# Patient Record
Sex: MALE | Race: WHITE | NOT HISPANIC OR LATINO | Employment: FULL TIME | URBAN - METROPOLITAN AREA
[De-identification: names, ages, dates, MRNs, and addresses within clinical notes are randomized per-mention and may not be internally consistent; named-entity substitution may affect disease eponyms.]

---

## 2023-08-15 PROBLEM — E74.10 DIETARY FRUCTOSE INTOLERANCE: Status: ACTIVE | Noted: 2023-08-15

## 2023-08-15 PROBLEM — E55.9 VITAMIN D DEFICIENCY: Status: ACTIVE | Noted: 2023-08-15

## 2023-08-15 PROBLEM — R35.0 URINARY FREQUENCY: Status: ACTIVE | Noted: 2023-08-15

## 2023-08-15 PROBLEM — G56.00 CARPAL TUNNEL SYNDROME: Status: ACTIVE | Noted: 2023-08-15

## 2023-08-15 PROBLEM — K57.90 DIVERTICULOSIS: Status: ACTIVE | Noted: 2023-08-15

## 2023-08-15 PROBLEM — R91.1 INCIDENTAL LUNG NODULE, > 3MM AND < 8MM: Status: ACTIVE | Noted: 2023-08-15

## 2023-08-15 PROBLEM — E79.0 HYPERURICEMIA: Status: ACTIVE | Noted: 2023-08-15

## 2023-08-15 PROBLEM — K21.9 GASTROESOPHAGEAL REFLUX DISEASE: Status: ACTIVE | Noted: 2023-08-15

## 2023-08-15 PROBLEM — G47.30 SLEEP APNEA: Status: ACTIVE | Noted: 2023-08-15

## 2023-08-15 PROBLEM — M21.619 BUNION: Status: ACTIVE | Noted: 2023-08-15

## 2023-08-15 PROBLEM — I10 ESSENTIAL HYPERTENSION, BENIGN: Status: ACTIVE | Noted: 2023-08-15

## 2023-08-15 PROBLEM — E78.5 HYPERLIPIDEMIA: Status: ACTIVE | Noted: 2023-08-15

## 2023-08-15 PROBLEM — H18.20 CORNEAL EDEMA: Status: ACTIVE | Noted: 2023-08-15

## 2023-08-15 PROBLEM — T86.8409 CORNEAL GRAFT REJECTION: Status: ACTIVE | Noted: 2023-08-15

## 2023-08-15 PROBLEM — K64.9 BLEEDING HEMORRHOIDS: Status: ACTIVE | Noted: 2023-08-15

## 2023-08-15 PROBLEM — H40.9 GLAUCOMA: Status: ACTIVE | Noted: 2023-08-15

## 2023-08-15 PROBLEM — R19.7 DIARRHEA, UNSPECIFIED: Status: ACTIVE | Noted: 2023-08-15

## 2023-08-15 PROBLEM — E66.3 OVERWEIGHT: Status: ACTIVE | Noted: 2023-08-15

## 2023-08-15 PROBLEM — M10.9 GOUT: Status: ACTIVE | Noted: 2023-08-15

## 2023-08-15 PROBLEM — E78.2 HYPERLIPIDEMIA, MIXED: Status: ACTIVE | Noted: 2023-08-15

## 2023-08-15 RX ORDER — LATANOPROST 50 UG/ML
1 SOLUTION/ DROPS OPHTHALMIC NIGHTLY
COMMUNITY
Start: 2019-10-25 | End: 2023-12-08 | Stop reason: SDUPTHER

## 2023-08-15 RX ORDER — VIT C/E/ZN/COPPR/LUTEIN/ZEAXAN 250MG-90MG
1 CAPSULE ORAL 2 TIMES DAILY
COMMUNITY
Start: 2015-02-25

## 2023-08-15 RX ORDER — PANTOPRAZOLE SODIUM 40 MG/1
1 TABLET, DELAYED RELEASE ORAL DAILY
COMMUNITY
End: 2024-04-19 | Stop reason: SDUPTHER

## 2023-08-15 RX ORDER — ALLOPURINOL 100 MG/1
2 TABLET ORAL DAILY
COMMUNITY
End: 2024-04-19 | Stop reason: SDUPTHER

## 2023-08-15 RX ORDER — ATORVASTATIN CALCIUM 40 MG/1
1 TABLET, FILM COATED ORAL NIGHTLY
COMMUNITY
Start: 2022-02-21 | End: 2024-04-19 | Stop reason: SDUPTHER

## 2023-08-15 RX ORDER — LISINOPRIL 10 MG/1
0.5 TABLET ORAL 2 TIMES DAILY
COMMUNITY
End: 2024-04-19 | Stop reason: SDUPTHER

## 2023-08-15 RX ORDER — PREDNISOLONE ACETATE 10 MG/ML
1 SUSPENSION/ DROPS OPHTHALMIC DAILY
COMMUNITY
Start: 2018-12-14 | End: 2023-12-08 | Stop reason: SDUPTHER

## 2023-08-15 RX ORDER — POLYMYXIN B SULFATE AND TRIMETHOPRIM 1; 10000 MG/ML; [USP'U]/ML
1 SOLUTION OPHTHALMIC ONCE
COMMUNITY
Start: 2017-12-12 | End: 2023-12-08 | Stop reason: SDUPTHER

## 2023-08-26 PROBLEM — K52.9 CHRONIC NONSPECIFIC COLITIS: Status: ACTIVE | Noted: 2023-08-26

## 2023-08-26 PROBLEM — D22.5 MELANOCYTIC NEVI OF TRUNK: Status: ACTIVE | Noted: 2023-07-14

## 2023-08-26 PROBLEM — L81.9 CHANGE IN PIGMENTED LESION OF FACE: Status: ACTIVE | Noted: 2023-08-26

## 2023-08-26 PROBLEM — D22.60 MELANOCYTIC NEVI OF UNSPECIFIED UPPER LIMB, INCLUDING SHOULDER: Status: ACTIVE | Noted: 2023-07-14

## 2023-08-26 PROBLEM — L57.9 SKIN CHANGES DUE TO CHRONIC EXPOSURE TO NONIONIZING RADIATION, UNSPECIFIED: Status: ACTIVE | Noted: 2023-07-14

## 2023-08-26 PROBLEM — R21 RASH: Status: ACTIVE | Noted: 2023-08-26

## 2023-08-26 PROBLEM — Z86.19 HISTORY OF HERPES ZOSTER: Status: ACTIVE | Noted: 2023-08-26

## 2023-08-26 PROBLEM — Z87.891 FORMER SMOKER: Status: ACTIVE | Noted: 2023-08-26

## 2023-08-26 PROBLEM — L57.0 ACTINIC KERATOSIS: Status: ACTIVE | Noted: 2023-07-14

## 2023-08-26 PROBLEM — D22.30 MELANOCYTIC NEVI OF UNSPECIFIED PART OF FACE: Status: ACTIVE | Noted: 2023-07-14

## 2023-08-26 PROBLEM — L81.4 OTHER MELANIN HYPERPIGMENTATION: Status: ACTIVE | Noted: 2023-07-14

## 2023-08-26 PROBLEM — D18.01 HEMANGIOMA OF SKIN AND SUBCUTANEOUS TISSUE: Status: ACTIVE | Noted: 2023-07-14

## 2023-08-26 RX ORDER — DOXAZOSIN 2 MG/1
2 TABLET ORAL DAILY
COMMUNITY
End: 2023-12-28

## 2023-09-27 PROBLEM — E66.3 OVERWEIGHT WITH BODY MASS INDEX (BMI) OF 26 TO 26.9 IN ADULT: Status: ACTIVE | Noted: 2023-09-27

## 2023-10-03 ENCOUNTER — APPOINTMENT (OUTPATIENT)
Dept: OPHTHALMOLOGY | Facility: CLINIC | Age: 68
End: 2023-10-03
Payer: MEDICARE

## 2023-11-07 ENCOUNTER — OFFICE VISIT (OUTPATIENT)
Dept: OPHTHALMOLOGY | Facility: CLINIC | Age: 68
End: 2023-11-07
Payer: MEDICARE

## 2023-11-07 DIAGNOSIS — H40.1112 PRIMARY OPEN ANGLE GLAUCOMA (POAG) OF RIGHT EYE, MODERATE STAGE: Primary | ICD-10-CM

## 2023-11-07 PROCEDURE — 99213 OFFICE O/P EST LOW 20 MIN: CPT | Performed by: OPHTHALMOLOGY

## 2023-11-07 ASSESSMENT — CONF VISUAL FIELD
OS_NORMAL: 1
OS_INFERIOR_TEMPORAL_RESTRICTION: 0
OD_SUPERIOR_NASAL_RESTRICTION: 0
OD_INFERIOR_TEMPORAL_RESTRICTION: 0
OD_NORMAL: 1
OS_SUPERIOR_NASAL_RESTRICTION: 0
OD_INFERIOR_NASAL_RESTRICTION: 0
OD_SUPERIOR_TEMPORAL_RESTRICTION: 0
OS_INFERIOR_NASAL_RESTRICTION: 0
OS_SUPERIOR_TEMPORAL_RESTRICTION: 0

## 2023-11-07 ASSESSMENT — CUP TO DISC RATIO
OS_RATIO: 0.65
OD_RATIO: 0.65

## 2023-11-07 ASSESSMENT — TONOMETRY
IOP_METHOD: GOLDMANN APPLANATION
OS_IOP_MMHG: 14
OD_IOP_MMHG: 13

## 2023-11-07 ASSESSMENT — SLIT LAMP EXAM - LIDS
COMMENTS: NORMAL
COMMENTS: NORMAL

## 2023-11-07 ASSESSMENT — VISUAL ACUITY
METHOD: SNELLEN - LINEAR
OD_SC: 20/20-2
OS_SC: 20/20

## 2023-11-24 ENCOUNTER — OFFICE VISIT (OUTPATIENT)
Dept: OPHTHALMOLOGY | Facility: CLINIC | Age: 68
End: 2023-11-24
Payer: MEDICARE

## 2023-11-24 DIAGNOSIS — Z94.7 CORNEAL TRANSPLANT STATUS: ICD-10-CM

## 2023-11-24 DIAGNOSIS — T86.8409 CORNEAL GRAFT REJECTION: Primary | ICD-10-CM

## 2023-11-24 PROCEDURE — 99212 OFFICE O/P EST SF 10 MIN: CPT | Performed by: OPTOMETRIST

## 2023-11-24 ASSESSMENT — SLIT LAMP EXAM - LIDS
COMMENTS: NORMAL
COMMENTS: NORMAL

## 2023-11-24 ASSESSMENT — REFRACTION_CURRENTRX
OD_BASECURVE: 9.8
OD_CYLINDER: SPHERE
OD_DIAMETER: 16.0
OD_SPHERE: -3.00

## 2023-11-24 ASSESSMENT — VISUAL ACUITY
OS_SC: 20/20
CORRECTION_TYPE: CONTACTS
OD_CC+: -2
OD_CC: 20/200
VA_OR_OD_CURRENT_RX: 20/200-
METHOD: SNELLEN - LINEAR

## 2023-11-24 NOTE — PROGRESS NOTES
Assessment/Plan   Diagnoses and all orders for this visit:  Corneal graft rejection  - Pt status post Kpro. 3 month BCL replacement. Kontour Precision sphere removed and replaced right eye (OD). Patient tolerated the procedure well. RTC every 3 months for BCL replacement, sooner with problems.

## 2023-11-24 NOTE — PROGRESS NOTES
Assessment/Plan   Diagnoses and all orders for this visit:  Corneal graft rejection  Corneal transplant status    BSCL exchanged right eye (OD)    Return in 3 months for next replacement, there are two more left in stock

## 2023-11-30 ENCOUNTER — OFFICE VISIT (OUTPATIENT)
Dept: PRIMARY CARE | Facility: CLINIC | Age: 68
End: 2023-11-30
Payer: MEDICARE

## 2023-11-30 VITALS
HEART RATE: 78 BPM | SYSTOLIC BLOOD PRESSURE: 129 MMHG | TEMPERATURE: 97.2 F | BODY MASS INDEX: 28.17 KG/M2 | WEIGHT: 208 LBS | DIASTOLIC BLOOD PRESSURE: 74 MMHG | HEIGHT: 72 IN

## 2023-11-30 DIAGNOSIS — E78.2 MIXED HYPERLIPIDEMIA: ICD-10-CM

## 2023-11-30 DIAGNOSIS — N52.9 ERECTILE DYSFUNCTION, UNSPECIFIED ERECTILE DYSFUNCTION TYPE: ICD-10-CM

## 2023-11-30 DIAGNOSIS — R91.1 INCIDENTAL LUNG NODULE, > 3MM AND < 8MM: ICD-10-CM

## 2023-11-30 DIAGNOSIS — E55.9 VITAMIN D DEFICIENCY: ICD-10-CM

## 2023-11-30 DIAGNOSIS — E66.3 OVERWEIGHT: ICD-10-CM

## 2023-11-30 DIAGNOSIS — K21.9 GASTROESOPHAGEAL REFLUX DISEASE WITHOUT ESOPHAGITIS: ICD-10-CM

## 2023-11-30 DIAGNOSIS — I10 ESSENTIAL HYPERTENSION, BENIGN: Primary | ICD-10-CM

## 2023-11-30 DIAGNOSIS — E78.2 HYPERLIPIDEMIA, MIXED: ICD-10-CM

## 2023-11-30 DIAGNOSIS — G47.30 SLEEP APNEA, UNSPECIFIED TYPE: ICD-10-CM

## 2023-11-30 DIAGNOSIS — E79.0 HYPERURICEMIA: ICD-10-CM

## 2023-11-30 DIAGNOSIS — K57.90 DIVERTICULOSIS: ICD-10-CM

## 2023-11-30 DIAGNOSIS — Z87.891 FORMER SMOKER: ICD-10-CM

## 2023-11-30 DIAGNOSIS — H40.9 GLAUCOMA, UNSPECIFIED GLAUCOMA TYPE, UNSPECIFIED LATERALITY: ICD-10-CM

## 2023-11-30 PROCEDURE — 3078F DIAST BP <80 MM HG: CPT | Performed by: INTERNAL MEDICINE

## 2023-11-30 PROCEDURE — 90662 IIV NO PRSV INCREASED AG IM: CPT | Performed by: INTERNAL MEDICINE

## 2023-11-30 PROCEDURE — 3074F SYST BP LT 130 MM HG: CPT | Performed by: INTERNAL MEDICINE

## 2023-11-30 PROCEDURE — 1159F MED LIST DOCD IN RCRD: CPT | Performed by: INTERNAL MEDICINE

## 2023-11-30 PROCEDURE — 99213 OFFICE O/P EST LOW 20 MIN: CPT | Performed by: INTERNAL MEDICINE

## 2023-11-30 PROCEDURE — 1160F RVW MEDS BY RX/DR IN RCRD: CPT | Performed by: INTERNAL MEDICINE

## 2023-11-30 PROCEDURE — G0008 ADMIN INFLUENZA VIRUS VAC: HCPCS | Performed by: INTERNAL MEDICINE

## 2023-11-30 NOTE — PROGRESS NOTES
Assessment/Plan   68 years old male with chronic medical problems came for a follow-up visit.  Clinically he is doing well.  His chronic eye problems are stable except he does have glaucoma which needs to be further followed up.  He is complaining of erectile dysfunction which is relatively new.  We discussed the treatment options.  At this time I will hold off till patient has the further glucoma evaluation.  If he is approved by the eye physicians to take the Viagra or similar medication he will be prescribed.  The other options were reviewed and discussed.    Patient's other chronic problems are stable including hypertension hyperlipidemia.  Immunizations reviewed and updated.    Patient will follow-up with me for his regular care in 6 months time except if there is any problem he understands to see me soon.      Problem List Items Addressed This Visit       Diverticulosis    Essential hypertension, benign - Primary    Relevant Orders    CBC and Auto Differential    Comprehensive Metabolic Panel    TSH with reflex to Free T4 if abnormal    Gastroesophageal reflux disease    Glaucoma    Hyperuricemia    Relevant Orders    Uric Acid    Hyperlipidemia, mixed    Relevant Orders    Comprehensive Metabolic Panel    Lipid Panel    TSH with reflex to Free T4 if abnormal    Hyperlipidemia    Relevant Orders    Comprehensive Metabolic Panel    Lipid Panel    Incidental lung nodule, > 3mm and < 8mm    Overweight    Sleep apnea    Relevant Orders    TSH with reflex to Free T4 if abnormal    Vitamin D deficiency    Relevant Orders    TSH with reflex to Free T4 if abnormal    Vitamin D 25-Hydroxy,Total (for eval of Vitamin D levels)    Former smoker       Subjective     Patient ID: Osman Alaniz is a 68 y.o. male who presents for Follow-up (3 month follow up. ).    History of present illness  Patient came for a follow-up visit.  Overall patient is doing well.  He is following up with the ophthalmologist regularly.  He does  have history of glaucoma and he has a appointment pending with the specialist    Patient is complaining of erectile dysfunction.  He says he gets erection but does not last long.  He also has some problem with ejaculation.    His blood pressure is well-controlled.    He is trying to be active but he admits that he has hard time losing weight    Rest of the review of systems no acute complaints      Family History   Problem Relation Name Age of Onset    Diabetes Mother      Pancreatic cancer Mother      Brain cancer Father        Social History     Socioeconomic History    Marital status:      Spouse name: Not on file    Number of children: Not on file    Years of education: Not on file    Highest education level: Not on file   Occupational History    Not on file   Tobacco Use    Smoking status: Former     Types: Cigarettes    Smokeless tobacco: Never   Substance and Sexual Activity    Alcohol use: Not on file    Drug use: Not on file    Sexual activity: Not on file   Other Topics Concern    Not on file   Social History Narrative    Not on file     Social Determinants of Health     Financial Resource Strain: Not on file   Food Insecurity: Not on file   Transportation Needs: Not on file   Physical Activity: Not on file   Stress: Not on file   Social Connections: Not on file   Intimate Partner Violence: Not on file   Housing Stability: Not on file      Doxycycline and Penicillins         Objective     Vitals:    11/30/23 0824   BP: 129/74   Pulse: 78   Temp: 36.2 °C (97.2 °F)        Physical Exam  Moderate obese, well-nourished with no apparent distress. Alert oriented,  Skin: Normal turgor.   Head: Normocephalic, atraumatic.  Eyes: No acute changes, both eyes and cornea has no irritation. No conjunctivitis  No pallor of conjunctivae. Mucous membranes are moist  Neck: Supple. No JVD. No carotid bruit. No thyromegaly. No cervical lymphadenopathy.  No clubbing. Peripheral osteoarthritis noted.  Chest: Vesicular  breathing Bilaterally good air entry. No wheezing. No crackles.  Heart: Regular rate and rhythm. S1, S2 positive. No murmur.  Abdomen: Soft and nontender. Bowel sounds are positive. No organomegaly.  Extremities: Bilaterally no pedal pitting edema. Bilaterally 2+ dorsalis pedis pulses.  No calf tenderness. Homans sign is negative.  Neuro Exam: No focal signs. Gait is normal.   Problem List Items Addressed This Visit       Diverticulosis    Essential hypertension, benign - Primary    Relevant Orders    CBC and Auto Differential    Comprehensive Metabolic Panel    TSH with reflex to Free T4 if abnormal    Gastroesophageal reflux disease    Glaucoma    Hyperuricemia    Relevant Orders    Uric Acid    Hyperlipidemia, mixed    Relevant Orders    Comprehensive Metabolic Panel    Lipid Panel    TSH with reflex to Free T4 if abnormal    Hyperlipidemia    Relevant Orders    Comprehensive Metabolic Panel    Lipid Panel    Incidental lung nodule, > 3mm and < 8mm    Overweight    Sleep apnea    Relevant Orders    TSH with reflex to Free T4 if abnormal    Vitamin D deficiency    Relevant Orders    TSH with reflex to Free T4 if abnormal    Vitamin D 25-Hydroxy,Total (for eval of Vitamin D levels)    Former smoker        Orders Placed This Encounter   Procedures    Flu vaccine, quadrivalent, high-dose, preservative free, age 65y+ (FLUZONE)    CBC and Auto Differential     Standing Status:   Future     Standing Expiration Date:   11/30/2024     Order Specific Question:   Release result to [facility]     Answer:   Immediate    Comprehensive Metabolic Panel     Standing Status:   Future     Standing Expiration Date:   11/30/2024     Order Specific Question:   Release result to My[facility]     Answer:   Immediate    Lipid Panel     fasting     Standing Status:   Future     Standing Expiration Date:   5/30/2024     Order Specific Question:   Release result to [facility]     Answer:   Immediate [1]    Uric Acid     Standing Status:   Future      Standing Expiration Date:   11/30/2024     Order Specific Question:   Release result to [facility]     Answer:   Immediate [1]    TSH with reflex to Free T4 if abnormal     Standing Status:   Future     Standing Expiration Date:   11/30/2024     Order Specific Question:   Release result to [facility]     Answer:   Immediate    Vitamin D 25-Hydroxy,Total (for eval of Vitamin D levels)     Standing Status:   Future     Standing Expiration Date:   11/30/2024     Order Specific Question:   [facility] to [facility]     Answer:   Immediate        Lab Results   Component Value Date    WBC 5.1 08/31/2022    HGB 14.8 08/31/2022    HCT 45.3 08/31/2022     08/31/2022    CHOL 153 08/31/2022    TRIG 126 08/31/2022    HDL 48.0 08/31/2022    ALT 28 08/31/2022    AST 19 08/31/2022     08/31/2022    K 4.6 08/31/2022     08/31/2022    CREATININE 1.15 08/31/2022    BUN 17 08/31/2022    CO2 29 08/31/2022    TSH 1.84 08/31/2022     Lab Results   Component Value Date    CHOL 153 08/31/2022    CHHDL 3.2 08/31/2022       No results found.

## 2023-12-08 ENCOUNTER — OFFICE VISIT (OUTPATIENT)
Dept: OPHTHALMOLOGY | Facility: CLINIC | Age: 68
End: 2023-12-08
Payer: MEDICARE

## 2023-12-08 DIAGNOSIS — T86.8409 CORNEAL GRAFT REJECTION: Primary | ICD-10-CM

## 2023-12-08 DIAGNOSIS — Z94.7 CORNEAL TRANSPLANT STATUS: ICD-10-CM

## 2023-12-08 DIAGNOSIS — H40.1112 PRIMARY OPEN ANGLE GLAUCOMA (POAG) OF RIGHT EYE, MODERATE STAGE: ICD-10-CM

## 2023-12-08 DIAGNOSIS — H18.20 CORNEAL EDEMA: ICD-10-CM

## 2023-12-08 DIAGNOSIS — T85.612A ERODED CORNEAL SUTURE, INITIAL ENCOUNTER: ICD-10-CM

## 2023-12-08 PROCEDURE — 99214 OFFICE O/P EST MOD 30 MIN: CPT | Performed by: OPHTHALMOLOGY

## 2023-12-08 RX ORDER — POLYMYXIN B SULFATE AND TRIMETHOPRIM 1; 10000 MG/ML; [USP'U]/ML
1 SOLUTION OPHTHALMIC DAILY
Qty: 10 ML | Refills: 2 | Status: SHIPPED | OUTPATIENT
Start: 2023-12-08 | End: 2024-12-07

## 2023-12-08 RX ORDER — LATANOPROST 50 UG/ML
1 SOLUTION/ DROPS OPHTHALMIC NIGHTLY
Qty: 2.5 ML | Refills: 6 | Status: SHIPPED | OUTPATIENT
Start: 2023-12-08 | End: 2024-12-07

## 2023-12-08 RX ORDER — PREDNISOLONE ACETATE 10 MG/ML
1 SUSPENSION/ DROPS OPHTHALMIC DAILY
Qty: 5 ML | Refills: 3 | Status: SHIPPED | OUTPATIENT
Start: 2023-12-08 | End: 2024-12-07

## 2023-12-08 ASSESSMENT — VISUAL ACUITY
OD_SC+: +2
METHOD: SNELLEN - LINEAR
OD_SC: 20/400
OS_SC: 20/20

## 2023-12-08 ASSESSMENT — SLIT LAMP EXAM - LIDS
COMMENTS: NORMAL
COMMENTS: NORMAL

## 2023-12-08 ASSESSMENT — TONOMETRY: IOP_METHOD: GOLDMANN APPLANATION

## 2023-12-08 NOTE — PROGRESS NOTES
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 No complaints. Good CL fit.     - retroprosthetic membrane (subtle) Observe  - 3 loose sutures removed today under betadine and moxi cover   - cont polytrim OD QD   - cont prednisolone OD QD   - follow u with Dr. Lord for BCL   - Cornea 6 months      Chronic Angle Closure Glaucoma OD (Secondary to K-Pro) / Glaucoma Suspect OS:      - cont latanoprost OD QHS   - Now cared for by Dr. Bradshaw  - Pt would like clearance to start new ED medicine by Dr. Stein.  - No contraindication from a cornea standpoint  - Explained to pt the risk of ischemic optic neuropathy with ED medication use if he has other co morbidities such as DM or HTN  - Optic disc exam today shows slight cupping OD which is expected given his hx of trauma, multiple transplants and Kpro       Pseudophakia (PCIOL) OD / Early Cataract OS:  minimally visually significant   - Observe      6 months

## 2023-12-08 NOTE — LETTER
December 8, 2023    Marquis Stein MD  [address]  [facility] [address]     Patient: Osman Alaniz   YOB: 1955   Date of Visit: 12/8/2023       Dear Dr. Marquis Stein MD:    Thank you for referring Osman Alaniz to me for evaluation. 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 No complaints. Good CL fit.     - retroprosthetic membrane (subtle) Observe  - 3 loose sutures removed today under betadine and moxi cover   - cont polytrim OD QD   - cont prednisolone OD QD   - follow u with Dr. Lord for BCL   - Cornea 6 months      Chronic Angle Closure Glaucoma OD (Secondary to K-Pro) / Glaucoma Suspect OS:      - cont latanoprost OD QHS   - Now cared for by Dr. Bradshaw  - Pt would like clearance to start new ED medicine by Dr. Stein.  - No contraindication from a cornea standpoint  - Explained to pt the risk of ischemic optic neuropathy with ED medication use if he has other co morbidities such as DM or HTN  - Optic disc exam today shows slight cupping OD which is expected given his hx of trauma, multiple transplants and Kpro       Pseudophakia (PCIOL) OD / Early Cataract OS:  minimally visually significant   - Observe      6 months  Below you will find my full exam findings. If you have questions, please do not hesitate to call me. I look forward to following Osman along with you.         Sincerely,        Christina Bowden MD        CC:   No Recipients        Base Eye Exam       Visual Acuity (Snellen - Linear)         Right Left    Dist sc 20/400 +2 20/20              Tonometry (Goldmann Applanation, 8:38 AM)         Right Left    Pressure BSCL               Pupils         Pupils    Right PERRL, No APD    Left PERRL, No APD              Neuro/Psych       Oriented x3: Yes    Mood/Affect: Normal                  Slit Lamp and Fundus Exam       Slit Lamp Exam         Right  Left    Lids/Lashes Normal Normal    Conjunctiva/Sclera White and quiet White and quiet    Cornea k-pro clear Clear    Anterior Chamber Deep and quiet Deep and quiet    Iris Round and reactive Round and reactive    Lens Posterior chamber intraocular lens 1+ Nuclear sclerosis    Anterior Vitreous Normal Normal

## 2023-12-25 DIAGNOSIS — I10 ESSENTIAL HYPERTENSION, BENIGN: Primary | ICD-10-CM

## 2023-12-28 RX ORDER — DOXAZOSIN 2 MG/1
2 TABLET ORAL DAILY
Qty: 90 TABLET | Refills: 1 | Status: SHIPPED | OUTPATIENT
Start: 2023-12-28 | End: 2024-04-19 | Stop reason: SDUPTHER

## 2024-03-01 ENCOUNTER — APPOINTMENT (OUTPATIENT)
Dept: OPHTHALMOLOGY | Facility: CLINIC | Age: 69
End: 2024-03-01
Payer: MEDICARE

## 2024-03-29 ENCOUNTER — APPOINTMENT (OUTPATIENT)
Dept: OPHTHALMOLOGY | Facility: CLINIC | Age: 69
End: 2024-03-29
Payer: MEDICARE

## 2024-04-05 ENCOUNTER — OFFICE VISIT (OUTPATIENT)
Dept: OPHTHALMOLOGY | Facility: CLINIC | Age: 69
End: 2024-04-05
Payer: MEDICARE

## 2024-04-05 DIAGNOSIS — T86.8409 CORNEAL GRAFT REJECTION: ICD-10-CM

## 2024-04-05 DIAGNOSIS — Z94.7 CORNEAL TRANSPLANT STATUS: Primary | ICD-10-CM

## 2024-04-05 PROCEDURE — 1159F MED LIST DOCD IN RCRD: CPT | Performed by: OPTOMETRIST

## 2024-04-05 PROCEDURE — 99212 OFFICE O/P EST SF 10 MIN: CPT | Performed by: OPTOMETRIST

## 2024-04-05 ASSESSMENT — ENCOUNTER SYMPTOMS
GASTROINTESTINAL NEGATIVE: 0
CARDIOVASCULAR NEGATIVE: 0
EYES NEGATIVE: 0
CONSTITUTIONAL NEGATIVE: 0
PSYCHIATRIC NEGATIVE: 0
ALLERGIC/IMMUNOLOGIC NEGATIVE: 0
MUSCULOSKELETAL NEGATIVE: 0
NEUROLOGICAL NEGATIVE: 0
RESPIRATORY NEGATIVE: 0
ENDOCRINE NEGATIVE: 0
HEMATOLOGIC/LYMPHATIC NEGATIVE: 0

## 2024-04-05 ASSESSMENT — REFRACTION_CURRENTRX
OD_DIAMETER: 16.0
OD_CYLINDER: SPHERE
OD_BASECURVE: 9.8
OD_SPHERE: -3.00

## 2024-04-05 ASSESSMENT — VISUAL ACUITY
OD_CC: 20/200
OS_SC: 20/20
OS_SC+: -2
CORRECTION_TYPE: CONTACTS
OD_CC+: -2
METHOD: SNELLEN - LINEAR

## 2024-04-05 ASSESSMENT — SLIT LAMP EXAM - LIDS
COMMENTS: NORMAL
COMMENTS: NORMAL

## 2024-04-05 NOTE — PROGRESS NOTES
Assessment/Plan   Diagnoses and all orders for this visit:  Corneal transplant status  Corneal graft rejection    Replaced BSCL Right eye with a new Kontur CL which patient wears 24/7  Have 3 more unopened lenses at [facility]  Follow up 3 months to change BSCL Right eye    Follow up Dr Bradshaw and Kal as scheduled

## 2024-04-18 DIAGNOSIS — K21.9 GASTROESOPHAGEAL REFLUX DISEASE WITHOUT ESOPHAGITIS: ICD-10-CM

## 2024-04-18 DIAGNOSIS — E79.0 HYPERURICEMIA: ICD-10-CM

## 2024-04-18 DIAGNOSIS — E78.2 HYPERLIPIDEMIA, MIXED: Primary | ICD-10-CM

## 2024-04-18 DIAGNOSIS — I10 ESSENTIAL HYPERTENSION, BENIGN: ICD-10-CM

## 2024-04-18 NOTE — TELEPHONE ENCOUNTER
Pt requesting rx refill for lisinopril, doxazosin mesylate, allopurinol, atorvastatin calcium and pantoprazole to [facility]. Pt has pending appt 5/30/24 and LOV 11/30/23.

## 2024-04-19 RX ORDER — ALLOPURINOL 100 MG/1
200 TABLET ORAL DAILY
Qty: 180 TABLET | Refills: 3 | Status: SHIPPED | OUTPATIENT
Start: 2024-04-19 | End: 2024-04-29 | Stop reason: SDUPTHER

## 2024-04-19 RX ORDER — ATORVASTATIN CALCIUM 40 MG/1
40 TABLET, FILM COATED ORAL NIGHTLY
Qty: 90 TABLET | Refills: 3 | Status: SHIPPED | OUTPATIENT
Start: 2024-04-19 | End: 2024-04-29 | Stop reason: SDUPTHER

## 2024-04-19 RX ORDER — DOXAZOSIN 2 MG/1
2 TABLET ORAL DAILY
Qty: 90 TABLET | Refills: 3 | Status: SHIPPED | OUTPATIENT
Start: 2024-04-19 | End: 2024-04-29 | Stop reason: SDUPTHER

## 2024-04-19 RX ORDER — LISINOPRIL 10 MG/1
5 TABLET ORAL 2 TIMES DAILY
Qty: 90 TABLET | Refills: 3 | Status: SHIPPED | OUTPATIENT
Start: 2024-04-19 | End: 2024-04-29 | Stop reason: SDUPTHER

## 2024-04-19 RX ORDER — PANTOPRAZOLE SODIUM 40 MG/1
40 TABLET, DELAYED RELEASE ORAL DAILY
Qty: 90 TABLET | Refills: 3 | Status: SHIPPED | OUTPATIENT
Start: 2024-04-19 | End: 2024-04-29 | Stop reason: SDUPTHER

## 2024-04-29 DIAGNOSIS — E79.0 HYPERURICEMIA: ICD-10-CM

## 2024-04-29 DIAGNOSIS — E78.2 HYPERLIPIDEMIA, MIXED: ICD-10-CM

## 2024-04-29 DIAGNOSIS — K21.9 GASTROESOPHAGEAL REFLUX DISEASE WITHOUT ESOPHAGITIS: ICD-10-CM

## 2024-04-29 DIAGNOSIS — I10 ESSENTIAL HYPERTENSION, BENIGN: ICD-10-CM

## 2024-04-29 RX ORDER — ATORVASTATIN CALCIUM 40 MG/1
40 TABLET, FILM COATED ORAL NIGHTLY
Qty: 90 TABLET | Refills: 0 | Status: SHIPPED | OUTPATIENT
Start: 2024-04-29 | End: 2024-05-02 | Stop reason: SDUPTHER

## 2024-04-29 RX ORDER — LISINOPRIL 10 MG/1
5 TABLET ORAL 2 TIMES DAILY
Qty: 90 TABLET | Refills: 0 | Status: SHIPPED | OUTPATIENT
Start: 2024-04-29 | End: 2024-05-02 | Stop reason: SDUPTHER

## 2024-04-29 RX ORDER — DOXAZOSIN 2 MG/1
2 TABLET ORAL DAILY
Qty: 90 TABLET | Refills: 0 | Status: SHIPPED | OUTPATIENT
Start: 2024-04-29 | End: 2024-05-02 | Stop reason: SDUPTHER

## 2024-04-29 RX ORDER — PANTOPRAZOLE SODIUM 40 MG/1
40 TABLET, DELAYED RELEASE ORAL DAILY
Qty: 90 TABLET | Refills: 0 | Status: SHIPPED | OUTPATIENT
Start: 2024-04-29 | End: 2024-05-02 | Stop reason: SDUPTHER

## 2024-04-29 RX ORDER — ALLOPURINOL 100 MG/1
200 TABLET ORAL DAILY
Qty: 180 TABLET | Refills: 0 | Status: SHIPPED | OUTPATIENT
Start: 2024-04-29 | End: 2024-05-02 | Stop reason: SDUPTHER

## 2024-04-29 NOTE — TELEPHONE ENCOUNTER
Rx Refill Request Telephone Encounter    Name:  Osman Alaniz  :  [id number]    Medication Name:    90 days of    lisinopril, doxazosin mesylate, allopurinol, atorvastatin calcium and pantoprazole     Pharmacy:   to [facility]....please cancel [facility] he no longer uses    ------------------------------------------------------------------------------------------------  OUT OF MEDS    lisinopril, doxazosin mesylate, and pantoprazole     14 day supply to   [id number] - [place], [place] - [address] Phone: [phone]   Fax: [phone]          Date of last appointment:  23.   Date of next appointment:  24

## 2024-05-02 DIAGNOSIS — K21.9 GASTROESOPHAGEAL REFLUX DISEASE WITHOUT ESOPHAGITIS: ICD-10-CM

## 2024-05-02 DIAGNOSIS — I10 ESSENTIAL HYPERTENSION, BENIGN: ICD-10-CM

## 2024-05-02 DIAGNOSIS — E79.0 HYPERURICEMIA: ICD-10-CM

## 2024-05-02 DIAGNOSIS — E78.2 HYPERLIPIDEMIA, MIXED: ICD-10-CM

## 2024-05-02 RX ORDER — ATORVASTATIN CALCIUM 40 MG/1
40 TABLET, FILM COATED ORAL NIGHTLY
Qty: 14 TABLET | Refills: 0 | Status: SHIPPED | OUTPATIENT
Start: 2024-05-02 | End: 2024-05-09 | Stop reason: SDUPTHER

## 2024-05-02 RX ORDER — PANTOPRAZOLE SODIUM 40 MG/1
40 TABLET, DELAYED RELEASE ORAL DAILY
Qty: 14 TABLET | Refills: 0 | Status: SHIPPED | OUTPATIENT
Start: 2024-05-02 | End: 2024-05-09 | Stop reason: SDUPTHER

## 2024-05-02 RX ORDER — ALLOPURINOL 100 MG/1
200 TABLET ORAL DAILY
Qty: 28 TABLET | Refills: 0 | Status: SHIPPED | OUTPATIENT
Start: 2024-05-02 | End: 2024-05-09 | Stop reason: SDUPTHER

## 2024-05-02 RX ORDER — LISINOPRIL 10 MG/1
5 TABLET ORAL 2 TIMES DAILY
Qty: 14 TABLET | Refills: 0 | Status: SHIPPED | OUTPATIENT
Start: 2024-05-02 | End: 2024-05-09 | Stop reason: SDUPTHER

## 2024-05-02 RX ORDER — DOXAZOSIN 2 MG/1
2 TABLET ORAL DAILY
Qty: 14 TABLET | Refills: 0 | Status: SHIPPED | OUTPATIENT
Start: 2024-05-02 | End: 2024-05-09 | Stop reason: SDUPTHER

## 2024-05-09 ENCOUNTER — TELEPHONE (OUTPATIENT)
Dept: PRIMARY CARE | Facility: CLINIC | Age: 69
End: 2024-05-09
Payer: MEDICARE

## 2024-05-09 DIAGNOSIS — E78.2 HYPERLIPIDEMIA, MIXED: ICD-10-CM

## 2024-05-09 DIAGNOSIS — I10 ESSENTIAL HYPERTENSION, BENIGN: ICD-10-CM

## 2024-05-09 DIAGNOSIS — E79.0 HYPERURICEMIA: ICD-10-CM

## 2024-05-09 DIAGNOSIS — K21.9 GASTROESOPHAGEAL REFLUX DISEASE WITHOUT ESOPHAGITIS: ICD-10-CM

## 2024-05-09 RX ORDER — LISINOPRIL 10 MG/1
5 TABLET ORAL 2 TIMES DAILY
Qty: 90 TABLET | Refills: 1 | Status: SHIPPED | OUTPATIENT
Start: 2024-05-09 | End: 2024-05-30 | Stop reason: SDUPTHER

## 2024-05-09 RX ORDER — ATORVASTATIN CALCIUM 40 MG/1
40 TABLET, FILM COATED ORAL NIGHTLY
Qty: 90 TABLET | Refills: 1 | Status: SHIPPED | OUTPATIENT
Start: 2024-05-09 | End: 2025-05-09

## 2024-05-09 RX ORDER — ALLOPURINOL 100 MG/1
200 TABLET ORAL DAILY
Qty: 180 TABLET | Refills: 1 | Status: SHIPPED | OUTPATIENT
Start: 2024-05-09 | End: 2025-05-09

## 2024-05-09 RX ORDER — PANTOPRAZOLE SODIUM 40 MG/1
40 TABLET, DELAYED RELEASE ORAL DAILY
Qty: 90 TABLET | Refills: 1 | Status: SHIPPED | OUTPATIENT
Start: 2024-05-09 | End: 2025-05-09

## 2024-05-09 RX ORDER — DOXAZOSIN 2 MG/1
2 TABLET ORAL DAILY
Qty: 90 TABLET | Refills: 1 | Status: SHIPPED | OUTPATIENT
Start: 2024-05-09 | End: 2025-05-09

## 2024-05-09 NOTE — TELEPHONE ENCOUNTER
Pt called and is needing some refills, he got a 2 wk supply at a local pharmacy and then they were supposed to get sent to [facility] but they were canceled. Pt is now about out of medication again so can pt is requesting another 2 wk supply at   [id number], [place] - [address] [place] [address]  Phone: [phone]  Fax: [phone]   And then sent to [facility] NOT the [facility].. pt would like that one removed.    Rx Refill Request Telephone Encounter    Name:  Osman Alaniz  :  [id number]  Medication Name:  lisinopril 10 mg tablet   Medication Name: pantoprazole (ProtoNix) 40 mg EC tablet   Medication Name: doxazosin (Cardura) 2 mg tablet   Medication Name: atorvastatin (Lipitor) 40 mg tablet   Medication Name: allopurinol (Zyloprim) 100 mg tablet     Specific Pharmacy location:  [facility] [address] [place] [address], [place] [address]  Phone: [phone]  Fax: [phone] (2 wk supply) and also [facility]    Date of last appointment:  2023  Date of next appointment:  2024  Best number to reach patient:  [phone]

## 2024-05-10 ENCOUNTER — APPOINTMENT (OUTPATIENT)
Dept: OPHTHALMOLOGY | Facility: CLINIC | Age: 69
End: 2024-05-10
Payer: MEDICARE

## 2024-05-20 ENCOUNTER — APPOINTMENT (OUTPATIENT)
Dept: OPHTHALMOLOGY | Facility: CLINIC | Age: 69
End: 2024-05-20
Payer: MEDICARE

## 2024-05-30 ENCOUNTER — OFFICE VISIT (OUTPATIENT)
Dept: PRIMARY CARE | Facility: CLINIC | Age: 69
End: 2024-05-30
Payer: MEDICARE

## 2024-05-30 VITALS
DIASTOLIC BLOOD PRESSURE: 71 MMHG | WEIGHT: 203.2 LBS | HEART RATE: 60 BPM | SYSTOLIC BLOOD PRESSURE: 119 MMHG | BODY MASS INDEX: 27.52 KG/M2 | HEIGHT: 72 IN

## 2024-05-30 DIAGNOSIS — Z00.00 ROUTINE GENERAL MEDICAL EXAMINATION AT HEALTH CARE FACILITY: Primary | ICD-10-CM

## 2024-05-30 DIAGNOSIS — N52.9 ERECTILE DYSFUNCTION, UNSPECIFIED ERECTILE DYSFUNCTION TYPE: ICD-10-CM

## 2024-05-30 DIAGNOSIS — H91.90 DECREASED HEARING, UNSPECIFIED LATERALITY: ICD-10-CM

## 2024-05-30 DIAGNOSIS — Z87.891 FORMER SMOKER: ICD-10-CM

## 2024-05-30 DIAGNOSIS — Z13.29 SCREENING FOR THYROID DISORDER: ICD-10-CM

## 2024-05-30 DIAGNOSIS — I10 ESSENTIAL HYPERTENSION, BENIGN: ICD-10-CM

## 2024-05-30 DIAGNOSIS — E66.3 OVERWEIGHT: ICD-10-CM

## 2024-05-30 DIAGNOSIS — E78.2 HYPERLIPIDEMIA, MIXED: ICD-10-CM

## 2024-05-30 DIAGNOSIS — E55.9 VITAMIN D DEFICIENCY: ICD-10-CM

## 2024-05-30 DIAGNOSIS — K21.9 GASTROESOPHAGEAL REFLUX DISEASE WITHOUT ESOPHAGITIS: ICD-10-CM

## 2024-05-30 PROCEDURE — 1170F FXNL STATUS ASSESSED: CPT | Performed by: INTERNAL MEDICINE

## 2024-05-30 PROCEDURE — 1123F ACP DISCUSS/DSCN MKR DOCD: CPT | Performed by: INTERNAL MEDICINE

## 2024-05-30 PROCEDURE — 1036F TOBACCO NON-USER: CPT | Performed by: INTERNAL MEDICINE

## 2024-05-30 PROCEDURE — G0009 ADMIN PNEUMOCOCCAL VACCINE: HCPCS | Performed by: INTERNAL MEDICINE

## 2024-05-30 PROCEDURE — 1160F RVW MEDS BY RX/DR IN RCRD: CPT | Performed by: INTERNAL MEDICINE

## 2024-05-30 PROCEDURE — 1159F MED LIST DOCD IN RCRD: CPT | Performed by: INTERNAL MEDICINE

## 2024-05-30 PROCEDURE — 3074F SYST BP LT 130 MM HG: CPT | Performed by: INTERNAL MEDICINE

## 2024-05-30 PROCEDURE — 1158F ADVNC CARE PLAN TLK DOCD: CPT | Performed by: INTERNAL MEDICINE

## 2024-05-30 PROCEDURE — G0439 PPPS, SUBSEQ VISIT: HCPCS | Performed by: INTERNAL MEDICINE

## 2024-05-30 PROCEDURE — 90677 PCV20 VACCINE IM: CPT | Performed by: INTERNAL MEDICINE

## 2024-05-30 PROCEDURE — 3078F DIAST BP <80 MM HG: CPT | Performed by: INTERNAL MEDICINE

## 2024-05-30 RX ORDER — SILDENAFIL 50 MG/1
50 TABLET, FILM COATED ORAL DAILY PRN
Qty: 12 TABLET | Refills: 3 | Status: SHIPPED | OUTPATIENT
Start: 2024-05-30 | End: 2025-05-30

## 2024-05-30 RX ORDER — LISINOPRIL 20 MG/1
20 TABLET ORAL DAILY
Qty: 90 TABLET | Refills: 1 | Status: SHIPPED | OUTPATIENT
Start: 2024-05-30 | End: 2025-05-30

## 2024-05-30 ASSESSMENT — ACTIVITIES OF DAILY LIVING (ADL)
MANAGING_FINANCES: INDEPENDENT
BATHING: INDEPENDENT
TAKING_MEDICATION: INDEPENDENT
DRESSING: INDEPENDENT
GROCERY_SHOPPING: INDEPENDENT
DOING_HOUSEWORK: INDEPENDENT

## 2024-05-30 ASSESSMENT — PATIENT HEALTH QUESTIONNAIRE - PHQ9
2. FEELING DOWN, DEPRESSED OR HOPELESS: NOT AT ALL
1. LITTLE INTEREST OR PLEASURE IN DOING THINGS: NOT AT ALL
SUM OF ALL RESPONSES TO PHQ9 QUESTIONS 1 AND 2: 0

## 2024-05-30 NOTE — PROGRESS NOTES
Assessment/Plan   68 years old male who is generally active has history of hypertension hyperlipidemia and well-controlled.  Medications were reviewed and refilled.    Patient has chronic reflux disease and when he stops the PPI he gets the symptoms and he would like to continue the PPI and he had seen the GI in the past he is up-to-date with the colonoscopy.    He is a neck smoker and we will do a screening test for aortic aneurysm.    He has some feeling of decreased hearing and he will be sent to audiology.    We reviewed and discussed about his living will and DNR orders in detail as noted below.    Patient's erectile dysfunction was reviewed and discussed medically there is no contraindication and he got the okay from the ophthalmologist.  Will start him on generic Viagra side effects risk and benefits were reviewed and discussed.  Patient will call if there is any significant concern or side effects.    Patient will be followed up in 6 months with the blood test as ordered.      Problem List Items Addressed This Visit       Essential hypertension, benign    Relevant Medications    lisinopril 20 mg tablet    Other Relevant Orders    CBC and Auto Differential    Gastroesophageal reflux disease    Hyperlipidemia, mixed    Relevant Orders    Comprehensive Metabolic Panel    Lipid Panel    Overweight    Vitamin D deficiency    Relevant Orders    Vitamin D 25-Hydroxy,Total (for eval of Vitamin D levels)    Former smoker    Relevant Orders    Vascular US abdominal aorta anuerysm AAA screening     Other Visit Diagnoses       Routine general medical examination at health care facility    -  Primary    Relevant Orders    Vascular US abdominal aorta anuerysm AAA screening    Screening for thyroid disorder        Relevant Orders    TSH with reflex to Free T4 if abnormal    Erectile dysfunction, unspecified erectile dysfunction type        Relevant Medications    sildenafil (Viagra) 50 mg tablet            Subjective      Patient ID: Osman Alaniz is a 68 y.o. male who presents for annual wellness visit and also subsequent visit with medications refills and also discussion of erectile dysfunction.    History of present illness  68 years old male with chronic medical problems generally doing well came for a annual wellness visit and his 6 months regular medical care.    Overall patient says he is active but he does not exercise regularly.    He had seen the ophthalmologist and he says he was told by the ophthalmologist that there is no contraindications to take medications for erectile dysfunction such as Viagra.  He has a steady girlfriend and he says he gets erection but gets softer very quickly so he cannot perform.    He denies any chest pain or short of breath.  He is taking his medications and they are reviewed and refilled as appropriate.    He is following up with the ophthalmologist regularly.    He has some feeling of decreased hearing but it has not impaired his day-to-day activities.    He is an ex-smoker stopped smoking about 20 years ago.  ROS  Rest of the review of systems no acute complaints.    Family History   Problem Relation Name Age of Onset   • Diabetes Mother     • Pancreatic cancer Mother     • Brain cancer Father        Social History     Socioeconomic History   • Marital status:      Spouse name: Not on file   • Number of children: Not on file   • Years of education: Not on file   • Highest education level: Not on file   Occupational History   • Not on file   Tobacco Use   • Smoking status: Former     Types: Cigarettes   • Smokeless tobacco: Never   Substance and Sexual Activity   • Alcohol use: Not Currently     Comment: social   • Drug use: Not Currently   • Sexual activity: Not on file   Other Topics Concern   • Not on file   Social History Narrative   • Not on file     Social Determinants of Health     Financial Resource Strain: Not on file   Food Insecurity: Not on file   Transportation  Needs: Not on file   Physical Activity: Not on file   Stress: Not on file   Social Connections: Not on file   Intimate Partner Violence: Not on file   Housing Stability: Not on file      Doxycycline and Penicillins   Current Outpatient Medications   Medication Sig Dispense Refill   • allopurinol (Zyloprim) 100 mg tablet Take 2 tablets (200 mg) by mouth once daily. 180 tablet 1   • atorvastatin (Lipitor) 40 mg tablet Take 1 tablet (40 mg) by mouth once daily at bedtime. 90 tablet 1   • cholecalciferol (Vitamin D-3) 25 MCG (1000 UT) capsule Take 1 capsule (25 mcg) by mouth 2 times a day.     • doxazosin (Cardura) 2 mg tablet Take 1 tablet (2 mg) by mouth once daily. 90 tablet 1   • latanoprost (Xalatan) 0.005 % ophthalmic solution Administer 1 drop into the right eye once daily at bedtime. 2.5 mL 6   • pantoprazole (ProtoNix) 40 mg EC tablet Take 1 tablet (40 mg) by mouth once daily. 90 tablet 1   • polymyxin B sulf-trimethoprim (Polytrim) ophthalmic solution Administer 1 drop into the right eye once daily. 90 day supply 10 mL 2   • prednisoLONE acetate (Pred-Forte) 1 % ophthalmic suspension Administer 1 drop into the right eye once daily. 5 mL 3   • lisinopril 20 mg tablet Take 1 tablet (20 mg) by mouth once daily. 90 tablet 1   • sildenafil (Viagra) 50 mg tablet Take 1 tablet (50 mg) by mouth once daily as needed for erectile dysfunction. 12 tablet 3     No current facility-administered medications for this visit.      Discussed in detail the advance care planning.  He does not have a living will and he says he is family with it because his ex-wife went through it when she had a terminal illness and passed away.  Patient believes his elder daughter would be the person he would appoint has power of  and he is going to talk to her.  We had a detailed discussion about the end-of-life planning and patient says that he will make a decision and bring the papers to us.  The total time I spent with the patient with  end-of-life care discussion including advance care planning is about 17 minutes.    Objective     Vitals:    05/30/24 0803   BP: 119/71   Pulse: 60        Physical Exam  Moderate obese, well-nourished with no apparent distress. Alert oriented,  Mood is appropriate, memory is intact  Hearing is slightly impaired as per patient's history but the exam he is able to hear simple conversations very well.  Skin: Normal turgor.   Head: Normocephalic, atraumatic.  Eyes: No acute changes, both eyes and cornea has no irritation. No conjunctivitis  No pallor of conjunctivae. Mucous membranes are moist  Neck: Supple. No JVD. No carotid bruit. No thyromegaly. No cervical lymphadenopathy.  No clubbing. Peripheral osteoarthritis noted.  Chest: Vesicular breathing Bilaterally good air entry. No wheezing. No crackles.  Heart: Regular rate and rhythm. S1, S2 positive. No murmur.  Abdomen: Soft and nontender. Bowel sounds are positive. No organomegaly.  Extremities: Bilaterally no pedal pitting edema. Bilaterally 2+ dorsalis pedis pulses.  No calf tenderness. Homans sign is negative.  Neuro Exam: No focal signs. Gait is normal.       Problem List Items Addressed This Visit       Essential hypertension, benign    Relevant Medications    lisinopril 20 mg tablet    Other Relevant Orders    CBC and Auto Differential    Gastroesophageal reflux disease    Hyperlipidemia, mixed    Relevant Orders    Comprehensive Metabolic Panel    Lipid Panel    Overweight    Vitamin D deficiency    Relevant Orders    Vitamin D 25-Hydroxy,Total (for eval of Vitamin D levels)    Former smoker    Relevant Orders    Vascular US abdominal aorta anuerysm AAA screening     Other Visit Diagnoses       Routine general medical examination at health care facility    -  Primary    Relevant Orders    Vascular US abdominal aorta anuerysm AAA screening    Screening for thyroid disorder        Relevant Orders    TSH with reflex to Free T4 if abnormal    Erectile  dysfunction, unspecified erectile dysfunction type        Relevant Medications    sildenafil (Viagra) 50 mg tablet             Orders Placed This Encounter   Procedures   • CBC and Auto Differential     Standing Status:   Future     Standing Expiration Date:   11/30/2024     Order Specific Question:   Release result to [facility]     Answer:   Immediate   • Comprehensive Metabolic Panel     Standing Status:   Future     Standing Expiration Date:   11/30/2024     Order Specific Question:   Release result to [facility]     Answer:   Immediate   • Lipid Panel     fasting     Standing Status:   Future     Standing Expiration Date:   11/30/2024     Order Specific Question:   Release result to [facility]     Answer:   Immediate [1]   • TSH with reflex to Free T4 if abnormal     Standing Status:   Future     Standing Expiration Date:   11/30/2024     Order Specific Question:   Release result to [facility]     Answer:   Immediate   • Vitamin D 25-Hydroxy,Total (for eval of Vitamin D levels)     Standing Status:   Future     Standing Expiration Date:   11/30/2024     Order Specific Question:   Release result to [facility]     Answer:   Immediate        Lab Results   Component Value Date    WBC 5.1 08/31/2022    HGB 14.8 08/31/2022    HCT 45.3 08/31/2022     08/31/2022    CHOL 153 08/31/2022    TRIG 126 08/31/2022    HDL 48.0 08/31/2022    ALT 28 08/31/2022    AST 19 08/31/2022     08/31/2022    K 4.6 08/31/2022     08/31/2022    CREATININE 1.15 08/31/2022    BUN 17 08/31/2022    CO2 29 08/31/2022    TSH 1.84 08/31/2022     Lab Results   Component Value Date    CHOL 153 08/31/2022    CHHDL 3.2 08/31/2022       No results found.

## 2024-06-14 ENCOUNTER — APPOINTMENT (OUTPATIENT)
Dept: OPHTHALMOLOGY | Facility: CLINIC | Age: 69
End: 2024-06-14
Payer: MEDICARE

## 2024-06-25 ENCOUNTER — APPOINTMENT (OUTPATIENT)
Dept: OPHTHALMOLOGY | Facility: CLINIC | Age: 69
End: 2024-06-25
Payer: MEDICARE

## 2024-06-27 ENCOUNTER — HOSPITAL ENCOUNTER (OUTPATIENT)
Dept: VASCULAR MEDICINE | Facility: CLINIC | Age: 69
Discharge: HOME | End: 2024-06-27
Payer: MEDICARE

## 2024-06-27 DIAGNOSIS — Z00.00 ROUTINE GENERAL MEDICAL EXAMINATION AT HEALTH CARE FACILITY: ICD-10-CM

## 2024-06-27 DIAGNOSIS — Z87.891 FORMER SMOKER: ICD-10-CM

## 2024-06-27 DIAGNOSIS — Z13.6 ENCOUNTER FOR SCREENING FOR CARDIOVASCULAR DISORDERS: ICD-10-CM

## 2024-06-27 PROCEDURE — 76706 US ABDL AORTA SCREEN AAA: CPT

## 2024-06-27 PROCEDURE — 76706 US ABDL AORTA SCREEN AAA: CPT | Performed by: INTERNAL MEDICINE

## 2024-06-27 NOTE — RESULT ENCOUNTER NOTE
Screening ultrasound of the aorta shows no evidence of aneurysm.  Patient should continue the same management and follow-up as we discussed.

## 2024-06-28 ENCOUNTER — TELEPHONE (OUTPATIENT)
Dept: PRIMARY CARE | Facility: CLINIC | Age: 69
End: 2024-06-28
Payer: MEDICARE

## 2024-06-28 NOTE — TELEPHONE ENCOUNTER
----- Message from Marquis Stein MD sent at 6/27/2024  4:39 PM EDT -----  Screening ultrasound of the aorta shows no evidence of aneurysm.  Patient should continue the same management and follow-up as we discussed.   2

## 2024-07-19 ENCOUNTER — APPOINTMENT (OUTPATIENT)
Dept: DERMATOLOGY | Facility: CLINIC | Age: 69
End: 2024-07-19
Payer: MEDICARE

## 2024-07-19 DIAGNOSIS — L81.4 LENTIGO: ICD-10-CM

## 2024-07-19 DIAGNOSIS — L82.1 SEBORRHEIC KERATOSIS: ICD-10-CM

## 2024-07-19 DIAGNOSIS — Z12.83 ENCOUNTER FOR SCREENING FOR MALIGNANT NEOPLASM OF SKIN: Primary | ICD-10-CM

## 2024-07-19 DIAGNOSIS — L57.0 ACTINIC KERATOSIS: ICD-10-CM

## 2024-07-19 DIAGNOSIS — L57.8 PHOTOAGING OF SKIN: ICD-10-CM

## 2024-07-19 DIAGNOSIS — D18.01 HEMANGIOMA OF SKIN: ICD-10-CM

## 2024-07-19 PROCEDURE — 17000 DESTRUCT PREMALG LESION: CPT | Performed by: DERMATOLOGY

## 2024-07-19 PROCEDURE — 17003 DESTRUCT PREMALG LES 2-14: CPT | Performed by: DERMATOLOGY

## 2024-07-19 PROCEDURE — 1036F TOBACCO NON-USER: CPT | Performed by: DERMATOLOGY

## 2024-07-19 PROCEDURE — 1159F MED LIST DOCD IN RCRD: CPT | Performed by: DERMATOLOGY

## 2024-07-19 PROCEDURE — 99213 OFFICE O/P EST LOW 20 MIN: CPT | Performed by: DERMATOLOGY

## 2024-07-19 ASSESSMENT — DERMATOLOGY PATIENT ASSESSMENT
FOR PATIENTS COMING IN FOR A FOLLOW-UP VISIT - HAVE THERE BEEN ANY CHANGES IN YOUR HEALTH SINCE YOUR LAST VISIT: NO
DO YOU USE A TANNING BED: NO
DO YOU USE SUNSCREEN: OCCASIONALLY
ARE YOU AN ORGAN TRANSPLANT RECIPIENT: NO
DO YOU HAVE ANY NEW OR CHANGING LESIONS: NO

## 2024-07-19 ASSESSMENT — DERMATOLOGY QUALITY OF LIFE (QOL) ASSESSMENT
RATE HOW BOTHERED YOU ARE BY SYMPTOMS OF YOUR SKIN PROBLEM (EG, ITCHING, STINGING BURNING, HURTING OR SKIN IRRITATION): 0 - NEVER BOTHERED
WHAT SINGLE SKIN CONDITION LISTED BELOW IS THE PATIENT ANSWERING THE QUALITY-OF-LIFE ASSESSMENT QUESTIONS ABOUT: NONE OF THE ABOVE
ARE THERE EXCLUSIONS OR EXCEPTIONS FOR THE QUALITY OF LIFE ASSESSMENT: NO
RATE HOW BOTHERED YOU ARE BY EFFECTS OF YOUR SKIN PROBLEMS ON YOUR ACTIVITIES (EG, GOING OUT, ACCOMPLISHING WHAT YOU WANT, WORK ACTIVITIES OR YOUR RELATIONSHIPS WITH OTHERS): 0 - NEVER BOTHERED
RATE HOW EMOTIONALLY BOTHERED YOU ARE BY YOUR SKIN PROBLEM (FOR EXAMPLE, WORRY, EMBARRASSMENT, FRUSTRATION): 0 - NEVER BOTHERED

## 2024-07-19 ASSESSMENT — PATIENT GLOBAL ASSESSMENT (PGA): PATIENT GLOBAL ASSESSMENT: PATIENT GLOBAL ASSESSMENT:  1 - CLEAR

## 2024-07-19 ASSESSMENT — ITCH NUMERIC RATING SCALE: HOW SEVERE IS YOUR ITCHING?: 0

## 2024-07-19 NOTE — PROGRESS NOTES
Subjective     Osman Alaniz is a 68 y.o. male who presents for the following: Skin Check (Pt here for yearly FBSE. Hx of Aks. No concerns today.  ).     Review of Systems:  No other skin or systemic complaints other than what is documented elsewhere in the note.    The following portions of the chart were reviewed this encounter and updated as appropriate:         Skin Cancer History  No skin cancer on file.      Specialty Problems          Dermatology Problems    Actinic keratosis    Hemangioma of skin and subcutaneous tissue    Melanocytic nevi of trunk    Melanocytic nevi of unspecified part of face    Melanocytic nevi of unspecified upper limb, including shoulder    Other melanin hyperpigmentation    Skin changes due to chronic exposure to nonionizing radiation, unspecified    Change in pigmented lesion of face    Rash     Left lower chest             Objective   Well appearing patient in no apparent distress; mood and affect are within normal limits.    A full examination was performed including scalp, head, eyes, ears, nose, lips, neck, chest, axillae, abdomen, back, buttocks, bilateral upper extremities, bilateral lower extremities, hands, feet, fingers, toes, fingernails, and toenails. All findings within normal limits unless otherwise noted below.    Assessment/Plan   1. Encounter for screening for malignant neoplasm of skin  No suspicious lesions noted on examination today    The risk of chronic, cumulative sun damage and risk of development of skin cancer was reviewed today.   The importance of sun protection was reviewed: including the use of a broad spectrum sunscreen of at least SPF 30 that protects against both UVA/UVB rays, with ingredients such as Zinc oxide or titanium dioxide, wearing sun protective clothing and sun avoidance. We reviewed the warning signs of non-melanoma skin cancer and ABCDEs of melanoma  Please follow up should you notice any new or changing pre-existing skin  lesion.    Related Procedures  Follow Up In Dermatology - Established Patient    2. Actinic keratosis (5)  Mid Parietal Scalp (5)  Erythematous macules with gritty scale.    Lesions are due to cumulative sun damage over time and are pre-cancerous. They have a risk (although small in majority of patients) of developing into squamous cell carcinoma and therefore treatment recommendations were offered and discussed.   Treatments Discussed include LN2, photodynamic (blue light) therapy & topical chemotherapy creams, risks and benefits of each.     The risks and benefits of LN2 were reviewed including incomplete removal, crusting, blister hypo and/or hyperpigmentation, scarring and recurrence. Pt elected for LN2 today    Destr of lesion - Mid Parietal Scalp (5)  Complexity: simple    Destruction method: cryotherapy    Informed consent: discussed and consent obtained    Lesion destroyed using liquid nitrogen: Yes    Cryotherapy cycles:  1  Outcome: patient tolerated procedure well with no complications    Post-procedure details: wound care instructions given      3. Photoaging of skin  Mottled pigmentation with telangiectasias and brown reticular macules in sun exposed areas of the body.    The risk of chronic, cumulative sun damage and risk of development of skin cancer was reviewed today.   The importance of sun protection was reviewed: including the use of a broad spectrum sunscreen of at least SPF 30 that protects against both UVA/UVB rays, with ingredients such as Zinc oxide or titanium dioxide, wearing sun protective clothing and sun avoidance. We reviewed the warning signs of non-melanoma skin cancer and ABCDEs of melanoma  Please follow up should you notice any new or changing pre-existing skin lesion.    4. Hemangioma of skin  Cherry red papules    The benign nature of these skin lesions were reviewed, no treatment is necessary.   Please follow up for any new or pre-existing lesion that is changing in size,  shape, color, becomes painful, tender, itches or bleed.    5. Seborrheic keratosis  Brown, tan waxy macules and stuck on appearing papules and plaques    The benign nature of these skin lesions reviewed, reassure provided and no further treatment needed at this time.   These lesions can be removed, if symptomatic (itching, bleeding, rubbing on clothing, painful), otherwise removal is considered cosmetic.     6. Lentigo  Scattered tan macules in sun-exposed areas.    These are benign skin lesions due to sun exposure. They will darken in response to sun exposure. They should be monitored for change in size, shape or color.  These lesions can be treated cosmetically with topical creams, liquid nitrogen and a variety of lasers.      Follow up in 1 year for FBSE

## 2024-07-26 ENCOUNTER — APPOINTMENT (OUTPATIENT)
Dept: OPHTHALMOLOGY | Facility: CLINIC | Age: 69
End: 2024-07-26
Payer: MEDICARE

## 2024-07-26 DIAGNOSIS — Z94.7 CORNEAL TRANSPLANT STATUS: Primary | ICD-10-CM

## 2024-07-26 DIAGNOSIS — T86.8409 CORNEAL GRAFT REJECTION: ICD-10-CM

## 2024-07-26 PROCEDURE — 99212 OFFICE O/P EST SF 10 MIN: CPT | Performed by: OPTOMETRIST

## 2024-07-26 ASSESSMENT — REFRACTION_CURRENTRX
OD_BASECURVE: 9.8
OD_CYLINDER: SPHERE
OD_DIAMETER: 16.0
OD_SPHERE: -3.00

## 2024-07-26 ASSESSMENT — SLIT LAMP EXAM - LIDS
COMMENTS: NORMAL
COMMENTS: NORMAL

## 2024-07-26 ASSESSMENT — ENCOUNTER SYMPTOMS
NEUROLOGICAL NEGATIVE: 0
ENDOCRINE NEGATIVE: 0
ALLERGIC/IMMUNOLOGIC NEGATIVE: 0
CONSTITUTIONAL NEGATIVE: 0
PSYCHIATRIC NEGATIVE: 0
HEMATOLOGIC/LYMPHATIC NEGATIVE: 0
CARDIOVASCULAR NEGATIVE: 0
MUSCULOSKELETAL NEGATIVE: 0
GASTROINTESTINAL NEGATIVE: 0
EYES NEGATIVE: 1
RESPIRATORY NEGATIVE: 0

## 2024-07-26 ASSESSMENT — VISUAL ACUITY
OS_SC: 20/20
METHOD: SNELLEN - LINEAR
OD_SC: 20/200
OD_SC+: -2

## 2024-07-26 ASSESSMENT — TONOMETRY: IOP_METHOD: GOLDMANN APPLANATION

## 2024-07-26 NOTE — PROGRESS NOTES
Assessment/Plan   Diagnoses and all orders for this visit:  Corneal transplant status  Corneal graft rejection  Replaced BSCL Right eye with a new Kontur CL which patient wears 24/7  Have 2 more unopened lenses at [facility]  Follow up 3 months to change BSCL Right eye     Reminder patient need to reschedule appointments with [facility]

## 2024-08-09 ENCOUNTER — CLINICAL SUPPORT (OUTPATIENT)
Dept: AUDIOLOGY | Facility: CLINIC | Age: 69
End: 2024-08-09
Payer: MEDICARE

## 2024-08-09 DIAGNOSIS — H90.3 SNHL (SENSORY-NEURAL HEARING LOSS), ASYMMETRICAL: ICD-10-CM

## 2024-08-09 PROCEDURE — 92550 TYMPANOMETRY & REFLEX THRESH: CPT | Performed by: AUDIOLOGIST

## 2024-08-09 PROCEDURE — 92557 COMPREHENSIVE HEARING TEST: CPT | Performed by: AUDIOLOGIST

## 2024-08-09 NOTE — PROGRESS NOTES
"AUDIOLOGY ADULT AUDIOMETRIC EVALUATION      Name:  Osman Alaniz  :  1955  Age:  69 y.o.  Date of Evaluation:  2024    HISTORY  Reason for visit:  hearing concerns  Mr. Alaniz is seen 2024 at the request of  Marquis Stein MD for an evaluation of hearing.      Chief complaint:    Hearing concerns    Hearing loss:  could be better, symmetric per patient  Tinnitus:   denies  Otitis Media: no history of ear infections; ears frequently feel wet  Otologic surgical history:  denies  Dizziness/imbalance:  denies  Otalgia:  denies  Ear pressure/fullness:  denies   History of excessive noise exposure:  yes  Other:  ears always itch         EVALUATION  Please find audiogram in \"Media\" tab (Document Type:  Audiology Report) or included at the bottom of this note.    RESULTS   Otoscopic Evaluation: hair; non-occlusive cerumen bilaterally      Immittance Measures (226 Hz probe tone):   Tympanometry is consistent with normal middle ear pressure and normal tympanic membrane mobility bilaterally.       Ipsilateral acoustic reflexes (500-4000 Hz) are present for 500-2000 Hz bilaterally.    Test technique:  standard behavioral technique via [facility] earphones.  Reliability is good.    Pure Tone Audiometry:    Right ear:  Hearing sensitivity is in the normal hearing to moderate hearing loss range.  Left ear: Hearing sensitivity is in the normal hearing to moderately-severe hearing loss range.       Note asymmetry for 3000 Hz (left worse than right) and 8000 HZ (right worse than left)     Speech Audiometry:        Right Ear:  Speech Reception Threshold (SRT) was obtained at 10 dBHL                 Speech discrimination score was 96% in quiet when words were presented at 60 dBHL      Left Ear:  Speech Reception Threshold (SRT) was obtained at 15 dBHL                 Speech discrimination score was 100% in quiet when words were presented at 65 dBHL    IMPRESSIONS:  Patient is expected to have communication difficulty in adverse " listening environments.    Patient is expected to benefit from effective communication strategies.       RECOMMENDATIONS  Continue with medical follow-up with Meghan Choi CNP.  Reassess hearing in 1 year (or sooner if medically indicated or if there is a concern for a change in hearing).    Continue with medical follow-up as indicated.       PATIENT EDUCATION  Discussed results and recommendations with patient.  Questions were addressed and the patient was encouraged to contact our department should concerns arise.       ABDIAZIZ Dillon, CCC-A  Licensed Audiologist

## 2024-09-05 ENCOUNTER — APPOINTMENT (OUTPATIENT)
Dept: OPHTHALMOLOGY | Facility: CLINIC | Age: 69
End: 2024-09-05
Payer: MEDICARE

## 2024-09-05 DIAGNOSIS — H40.1112 PRIMARY OPEN ANGLE GLAUCOMA (POAG) OF RIGHT EYE, MODERATE STAGE: ICD-10-CM

## 2024-09-05 DIAGNOSIS — T85.612A ERODED CORNEAL SUTURE, INITIAL ENCOUNTER: ICD-10-CM

## 2024-09-05 DIAGNOSIS — Z94.7 CORNEAL TRANSPLANT STATUS: ICD-10-CM

## 2024-09-05 DIAGNOSIS — T86.8409 CORNEAL GRAFT REJECTION: Primary | ICD-10-CM

## 2024-09-05 PROCEDURE — 99214 OFFICE O/P EST MOD 30 MIN: CPT | Performed by: OPHTHALMOLOGY

## 2024-09-05 PROCEDURE — 15854 REMOVAL SUTR&STAPL XREQ ANES: CPT | Performed by: OPHTHALMOLOGY

## 2024-09-05 RX ORDER — POLYMYXIN B SULFATE AND TRIMETHOPRIM 1; 10000 MG/ML; [USP'U]/ML
1 SOLUTION OPHTHALMIC DAILY
Qty: 10 ML | Refills: 2 | Status: SHIPPED | OUTPATIENT
Start: 2024-09-05 | End: 2025-09-05

## 2024-09-05 RX ORDER — LATANOPROST 50 UG/ML
1 SOLUTION/ DROPS OPHTHALMIC NIGHTLY
Qty: 2.5 ML | Refills: 6 | Status: SHIPPED | OUTPATIENT
Start: 2024-09-05 | End: 2025-09-05

## 2024-09-05 RX ORDER — PREDNISOLONE ACETATE 10 MG/ML
1 SUSPENSION/ DROPS OPHTHALMIC DAILY
Qty: 5 ML | Refills: 3 | Status: SHIPPED | OUTPATIENT
Start: 2024-09-05 | End: 2025-09-05

## 2024-09-05 ASSESSMENT — ENCOUNTER SYMPTOMS
GASTROINTESTINAL NEGATIVE: 0
EYES NEGATIVE: 0
HEMATOLOGIC/LYMPHATIC NEGATIVE: 0
ENDOCRINE NEGATIVE: 0
NEUROLOGICAL NEGATIVE: 0
CONSTITUTIONAL NEGATIVE: 0
PSYCHIATRIC NEGATIVE: 0
MUSCULOSKELETAL NEGATIVE: 0
RESPIRATORY NEGATIVE: 0
ALLERGIC/IMMUNOLOGIC NEGATIVE: 0
CARDIOVASCULAR NEGATIVE: 0

## 2024-09-05 ASSESSMENT — CONF VISUAL FIELD
OD_SUPERIOR_TEMPORAL_RESTRICTION: 0
OD_INFERIOR_NASAL_RESTRICTION: 1
OS_INFERIOR_NASAL_RESTRICTION: 0
OS_INFERIOR_TEMPORAL_RESTRICTION: 0
OS_SUPERIOR_TEMPORAL_RESTRICTION: 0
OD_SUPERIOR_NASAL_RESTRICTION: 0
OS_SUPERIOR_NASAL_RESTRICTION: 0
OD_INFERIOR_TEMPORAL_RESTRICTION: 0
OS_NORMAL: 1
METHOD: COUNTING FINGERS

## 2024-09-05 ASSESSMENT — EXTERNAL EXAM - RIGHT EYE: OD_EXAM: NORMAL

## 2024-09-05 ASSESSMENT — EXTERNAL EXAM - LEFT EYE: OS_EXAM: NORMAL

## 2024-09-05 ASSESSMENT — VISUAL ACUITY
OS_SC: 20/20
OD_SC: 20/200
METHOD: SNELLEN - LINEAR
OD_SC+: -1

## 2024-09-05 ASSESSMENT — TONOMETRY
IOP_METHOD: TONOPEN
OS_IOP_MMHG: 16

## 2024-09-05 ASSESSMENT — SLIT LAMP EXAM - LIDS
COMMENTS: NORMAL
COMMENTS: NORMAL

## 2024-09-05 NOTE — PROGRESS NOTES
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 No complaints. Good CL fit.    - retroprosthetic membrane (subtle)   Observe  - cont polytrim OD QD  - cont prednisolone OD QD  - follow u with Dr. Lord for BCL  - Cornea 6 months    Eroded corneal suture  - 1 broken suture removed today under betadine and moxi cover  Increase polytrim to tid for 3 days     Chronic Angle Closure Glaucoma OD (Secondary to K-Pro) / Glaucoma Suspect OS:     - cont latanoprost OD QHS  - Now cared for by Dr. Bradshaw        Pseudophakia (PCIOL) OD / Early Cataract OS:  minimally visually significant   - Observe      6 months

## 2024-09-09 ENCOUNTER — APPOINTMENT (OUTPATIENT)
Dept: OPHTHALMOLOGY | Facility: CLINIC | Age: 69
End: 2024-09-09
Payer: MEDICARE

## 2024-09-09 DIAGNOSIS — Z94.7 CORNEAL TRANSPLANT STATUS: ICD-10-CM

## 2024-09-09 DIAGNOSIS — H40.1112 PRIMARY OPEN ANGLE GLAUCOMA (POAG) OF RIGHT EYE, MODERATE STAGE: ICD-10-CM

## 2024-09-09 PROCEDURE — 99214 OFFICE O/P EST MOD 30 MIN: CPT | Performed by: OPHTHALMOLOGY

## 2024-09-09 RX ORDER — LATANOPROST 50 UG/ML
1 SOLUTION/ DROPS OPHTHALMIC NIGHTLY
Qty: 2.5 ML | Refills: 6 | Status: SHIPPED | OUTPATIENT
Start: 2024-09-09 | End: 2025-09-09

## 2024-09-09 RX ORDER — PREDNISOLONE ACETATE 10 MG/ML
1 SUSPENSION/ DROPS OPHTHALMIC DAILY
Qty: 5 ML | Refills: 3 | Status: SHIPPED | OUTPATIENT
Start: 2024-09-09 | End: 2025-09-09

## 2024-09-09 ASSESSMENT — ENCOUNTER SYMPTOMS
EYES NEGATIVE: 0
NEUROLOGICAL NEGATIVE: 0
ENDOCRINE NEGATIVE: 0
RESPIRATORY NEGATIVE: 0
PSYCHIATRIC NEGATIVE: 0
HEMATOLOGIC/LYMPHATIC NEGATIVE: 0
MUSCULOSKELETAL NEGATIVE: 0
ALLERGIC/IMMUNOLOGIC NEGATIVE: 0
CONSTITUTIONAL NEGATIVE: 0
GASTROINTESTINAL NEGATIVE: 0
CARDIOVASCULAR NEGATIVE: 0

## 2024-09-09 ASSESSMENT — VISUAL ACUITY
OD_SC: 20/200
OD_SC+: -1
METHOD: SNELLEN - LINEAR
OS_SC: 20/20

## 2024-09-09 ASSESSMENT — TONOMETRY
IOP_METHOD: GOLDMANN APPLANATION
OS_IOP_MMHG: 14

## 2024-09-09 ASSESSMENT — CONF VISUAL FIELD
OD_SUPERIOR_NASAL_RESTRICTION: 0
OD_INFERIOR_TEMPORAL_RESTRICTION: 0
OS_INFERIOR_TEMPORAL_RESTRICTION: 0
OS_INFERIOR_NASAL_RESTRICTION: 0
METHOD: COUNTING FINGERS
OD_INFERIOR_NASAL_RESTRICTION: 0
OD_SUPERIOR_TEMPORAL_RESTRICTION: 0
OD_NORMAL: 1
OS_SUPERIOR_NASAL_RESTRICTION: 0
OS_NORMAL: 1
OS_SUPERIOR_TEMPORAL_RESTRICTION: 0

## 2024-09-09 ASSESSMENT — SLIT LAMP EXAM - LIDS
COMMENTS: NORMAL
COMMENTS: NORMAL

## 2024-09-09 ASSESSMENT — EXTERNAL EXAM - LEFT EYE: OS_EXAM: NORMAL

## 2024-09-09 ASSESSMENT — CUP TO DISC RATIO
OS_RATIO: 0.6
OD_RATIO: NOT SEEN

## 2024-09-09 ASSESSMENT — EXTERNAL EXAM - RIGHT EYE: OD_EXAM: NORMAL

## 2024-09-11 DIAGNOSIS — E78.2 HYPERLIPIDEMIA, MIXED: ICD-10-CM

## 2024-09-11 DIAGNOSIS — K21.9 GASTROESOPHAGEAL REFLUX DISEASE WITHOUT ESOPHAGITIS: ICD-10-CM

## 2024-09-11 DIAGNOSIS — N52.9 ERECTILE DYSFUNCTION, UNSPECIFIED ERECTILE DYSFUNCTION TYPE: ICD-10-CM

## 2024-09-11 DIAGNOSIS — I10 ESSENTIAL HYPERTENSION, BENIGN: ICD-10-CM

## 2024-09-11 DIAGNOSIS — E79.0 HYPERURICEMIA: ICD-10-CM

## 2024-09-11 NOTE — TELEPHONE ENCOUNTER
Rx Refill Request Telephone Encounter    New Pharmacy*    Name:  Osman Alaniz  :  [id number]  Medication Name:    lisinopril   Viagra  Allopurinol   Doxazosin  Atorvastatin  Pantoprazole * out of this*  Specific Pharmacy location: [facility] [place] [facility] [place]  Date of last appointment:  24  Date of next appointment:  24  Best number to reach patient:     New Pharmacy

## 2024-09-13 RX ORDER — LISINOPRIL 20 MG/1
20 TABLET ORAL DAILY
Qty: 90 TABLET | Refills: 1 | Status: SHIPPED | OUTPATIENT
Start: 2024-09-13 | End: 2025-09-13

## 2024-09-13 RX ORDER — ATORVASTATIN CALCIUM 40 MG/1
40 TABLET, FILM COATED ORAL NIGHTLY
Qty: 90 TABLET | Refills: 1 | Status: SHIPPED | OUTPATIENT
Start: 2024-09-13 | End: 2025-09-13

## 2024-09-13 RX ORDER — DOXAZOSIN 2 MG/1
2 TABLET ORAL DAILY
Qty: 90 TABLET | Refills: 1 | Status: SHIPPED | OUTPATIENT
Start: 2024-09-13 | End: 2025-09-13

## 2024-09-13 RX ORDER — PANTOPRAZOLE SODIUM 40 MG/1
40 TABLET, DELAYED RELEASE ORAL DAILY
Qty: 90 TABLET | Refills: 1 | Status: SHIPPED | OUTPATIENT
Start: 2024-09-13 | End: 2025-09-13

## 2024-09-13 RX ORDER — ALLOPURINOL 100 MG/1
200 TABLET ORAL DAILY
Qty: 180 TABLET | Refills: 1 | Status: SHIPPED | OUTPATIENT
Start: 2024-09-13 | End: 2025-09-13

## 2024-09-13 RX ORDER — SILDENAFIL 50 MG/1
50 TABLET, FILM COATED ORAL DAILY PRN
Qty: 12 TABLET | Refills: 3 | Status: SHIPPED | OUTPATIENT
Start: 2024-09-13 | End: 2025-09-13

## 2024-10-03 ENCOUNTER — OFFICE VISIT (OUTPATIENT)
Dept: OTOLARYNGOLOGY | Facility: CLINIC | Age: 69
End: 2024-10-03
Payer: MEDICARE

## 2024-10-03 VITALS
HEIGHT: 72 IN | WEIGHT: 203 LBS | SYSTOLIC BLOOD PRESSURE: 120 MMHG | DIASTOLIC BLOOD PRESSURE: 78 MMHG | TEMPERATURE: 97.9 F | HEART RATE: 71 BPM | BODY MASS INDEX: 27.5 KG/M2

## 2024-10-03 DIAGNOSIS — H61.22 EXCESSIVE CERUMEN IN LEFT EAR CANAL: ICD-10-CM

## 2024-10-03 DIAGNOSIS — H90.3 ASYMMETRICAL SENSORINEURAL HEARING LOSS: Primary | ICD-10-CM

## 2024-10-03 DIAGNOSIS — L29.9 EAR ITCHING: ICD-10-CM

## 2024-10-03 DIAGNOSIS — H93.8X3 SENSATION OF FULLNESS IN BOTH EARS: ICD-10-CM

## 2024-10-03 PROCEDURE — 99213 OFFICE O/P EST LOW 20 MIN: CPT | Performed by: NURSE PRACTITIONER

## 2024-10-03 PROCEDURE — 3078F DIAST BP <80 MM HG: CPT | Performed by: NURSE PRACTITIONER

## 2024-10-03 PROCEDURE — 99203 OFFICE O/P NEW LOW 30 MIN: CPT | Performed by: NURSE PRACTITIONER

## 2024-10-03 PROCEDURE — 3074F SYST BP LT 130 MM HG: CPT | Performed by: NURSE PRACTITIONER

## 2024-10-03 PROCEDURE — 1159F MED LIST DOCD IN RCRD: CPT | Performed by: NURSE PRACTITIONER

## 2024-10-03 PROCEDURE — 1126F AMNT PAIN NOTED NONE PRSNT: CPT | Performed by: NURSE PRACTITIONER

## 2024-10-03 PROCEDURE — 3008F BODY MASS INDEX DOCD: CPT | Performed by: NURSE PRACTITIONER

## 2024-10-03 PROCEDURE — 1036F TOBACCO NON-USER: CPT | Performed by: NURSE PRACTITIONER

## 2024-10-03 SDOH — ECONOMIC STABILITY: FOOD INSECURITY: WITHIN THE PAST 12 MONTHS, YOU WORRIED THAT YOUR FOOD WOULD RUN OUT BEFORE YOU GOT MONEY TO BUY MORE.: NEVER TRUE

## 2024-10-03 SDOH — ECONOMIC STABILITY: FOOD INSECURITY: WITHIN THE PAST 12 MONTHS, THE FOOD YOU BOUGHT JUST DIDN'T LAST AND YOU DIDN'T HAVE MONEY TO GET MORE.: NEVER TRUE

## 2024-10-03 ASSESSMENT — LIFESTYLE VARIABLES
SKIP TO QUESTIONS 9-10: 1
AUDIT-C TOTAL SCORE: 3
HOW OFTEN DO YOU HAVE A DRINK CONTAINING ALCOHOL: 2-3 TIMES A WEEK
HOW OFTEN DO YOU HAVE SIX OR MORE DRINKS ON ONE OCCASION: NEVER
HOW MANY STANDARD DRINKS CONTAINING ALCOHOL DO YOU HAVE ON A TYPICAL DAY: 1 OR 2

## 2024-10-03 ASSESSMENT — ENCOUNTER SYMPTOMS
OCCASIONAL FEELINGS OF UNSTEADINESS: 0
DEPRESSION: 0
LOSS OF SENSATION IN FEET: 0

## 2024-10-03 ASSESSMENT — PATIENT HEALTH QUESTIONNAIRE - PHQ9
SUM OF ALL RESPONSES TO PHQ9 QUESTIONS 1 AND 2: 0
2. FEELING DOWN, DEPRESSED OR HOPELESS: NOT AT ALL
1. LITTLE INTEREST OR PLEASURE IN DOING THINGS: NOT AT ALL

## 2024-10-03 ASSESSMENT — COLUMBIA-SUICIDE SEVERITY RATING SCALE - C-SSRS
6. HAVE YOU EVER DONE ANYTHING, STARTED TO DO ANYTHING, OR PREPARED TO DO ANYTHING TO END YOUR LIFE?: NO
2. HAVE YOU ACTUALLY HAD ANY THOUGHTS OF KILLING YOURSELF?: NO
1. IN THE PAST MONTH, HAVE YOU WISHED YOU WERE DEAD OR WISHED YOU COULD GO TO SLEEP AND NOT WAKE UP?: NO

## 2024-10-03 ASSESSMENT — PAIN SCALES - GENERAL: PAINLEVEL: 0-NO PAIN

## 2024-10-03 NOTE — PROGRESS NOTES
Subjective   Patient ID: Osman Alaniz is a 69 y.o. male who presents for ear issues.    HPI  Patient here for hearing loss. He was referred by PCP. His ears always feel like there is water in them and they itch. Denies ear pain, drainage or tinnitus. Denies vertigo.  Denies previous ear surgery. He does have a family history of hearing loss.   He admits to  loud noise exposure. Did wear ear protection [employer] work.    Patient Active Problem List   Diagnosis    Bleeding hemorrhoids    Bunion    Carpal tunnel syndrome    Corneal edema    Corneal graft rejection    Diarrhea, unspecified    Dietary fructose intolerance    Diverticulosis    Essential hypertension, benign    Gastroesophageal reflux disease    Glaucoma    Gout    Hyperuricemia    Hyperlipidemia, mixed    Hyperlipidemia    Incidental lung nodule, > 3mm and < 8mm    Overweight    Sleep apnea    Urinary frequency    Vitamin D deficiency    Hemangioma of skin and subcutaneous tissue    Melanocytic nevi of trunk    Melanocytic nevi of unspecified part of face    Melanocytic nevi of unspecified upper limb, including shoulder    Other melanin hyperpigmentation    Actinic keratosis    Skin changes due to chronic exposure to nonionizing radiation, unspecified    Change in pigmented lesion of face    Chronic nonspecific colitis    Former smoker    History of herpes zoster    Rash    Overweight with body mass index (BMI) of 26 to 26.9 in adult    Corneal transplant status    Eroded corneal suture     Past Surgical History:   Procedure Laterality Date    OTHER SURGICAL HISTORY  10/02/2014    Cornea Transplant    OTHER SURGICAL HISTORY  08/30/2022    Complete colonoscopy    OTHER SURGICAL HISTORY  10/26/2021    Dental implant procedure    OTHER SURGICAL HISTORY  10/26/2021    Oral surgery     Review of Systems    All other systems have been reviewed and are negative for complaints except for those mentioned in history of present illness, past medical history and problem  list.    Objective   Physical Exam    Constitutional: No fever, chills, weight loss or weight gain  General appearance: Appears well, well-nourished, well groomed. No acute distress.    Communication: Normal communication    Psychiatric: Oriented to person, place and time. Normal mood and affect.    Neurologic: Cranial nerves II-XII grossly intact and symmetric bilaterally.    Head and Face:  Head: Atraumatic with no masses, lesions or scarring.  Face: Normal symmetry. No scars or deformities.  TMJ: Normal, no trismus.    Eyes: Conjunctiva not edematous or erythematous.     Right Ear: External inspection of ear with no deformity, scars, or masses. EAC is clear.  TM is intact with no sign of infection, effusion, or retraction.  No perforation seen.     Left Ear: External inspection of ear with no deformity, scars, or masses. Ear canal is with non-occluding cerumen that was removed using the microscope and alligator forceps. After removal, TM is intact with no sign of infection, effusion, or retraction.   TM is intact with no sign of infection, effusion, or retraction.  No perforation seen.     Nose: External inspection of nose: No nasal lesions, lacerations or scars. Anterior rhinoscopy with limited visualization past the inferior turbinates. No tenderness on frontal or maxillary sinus palpation.    Oral Cavity/Mouth: Oral cavity and oropharynx mucosa moist and pink. No lesions or masses. Dentition normal. Tonsils appear normal. Uvula is midline. Tongue with no masses or lesions. Tongue with good mobility. The oropharynx is clear.    Neck: Normal appearing, symmetric, trachea midline.     Cardiovascular: Examination of peripheral vascular system shows no clubbing or cyanosis.    Respiratory: No respiratory distress increased work of breathing. Inspection of the chest with symmetric chest expansion and normal respiratory effort.    Skin: No head and neck rashes.    Lymph nodes: No adenopathy.     Diagnostic Results        Assessment/Plan   Diagnoses and all orders for this visit:  Asymmetrical sensorineural hearing loss  Ear itching  Sensation of fullness in both ears  Reviewed audiogram in detail.   We discussed the different etiologies of asymmetry including noise exposure, trauma, blood flow issues, viral inflammation, and acoustic neuroma.  I recommend MRI IAC to rule out retrocochlear pathology.  Patient would like to repeat audiogram next year and then consider MRI. He does not notice asymmetric hearing.   Excessive cerumen in left ear canal  Ear was successfully cleaned.    All questions answered to patient satisfaction.     SMITA Cordon-CNP 10/03/24 7:48 AM

## 2024-10-07 ENCOUNTER — TELEPHONE (OUTPATIENT)
Dept: PRIMARY CARE | Facility: CLINIC | Age: 69
End: 2024-10-07
Payer: MEDICARE

## 2024-10-07 NOTE — TELEPHONE ENCOUNTER
Patient was passed out this weekend and ambulance was called to his home. They did an EKG and told him to follow up with his primary care. He was not taken to the hospital so there are no hospital records from this event. No 30 min appointments available in in the near future on your schedule. Would you like us to add him on in a 15 min slot?

## 2024-10-09 ENCOUNTER — OFFICE VISIT (OUTPATIENT)
Dept: PRIMARY CARE | Facility: CLINIC | Age: 69
End: 2024-10-09
Payer: MEDICARE

## 2024-10-09 VITALS
BODY MASS INDEX: 27.5 KG/M2 | SYSTOLIC BLOOD PRESSURE: 138 MMHG | HEART RATE: 55 BPM | WEIGHT: 203 LBS | HEIGHT: 72 IN | OXYGEN SATURATION: 96 % | DIASTOLIC BLOOD PRESSURE: 74 MMHG

## 2024-10-09 DIAGNOSIS — K21.9 GASTROESOPHAGEAL REFLUX DISEASE WITHOUT ESOPHAGITIS: ICD-10-CM

## 2024-10-09 DIAGNOSIS — R05.4 COUGH SYNCOPE: ICD-10-CM

## 2024-10-09 DIAGNOSIS — I10 ESSENTIAL HYPERTENSION, BENIGN: Primary | ICD-10-CM

## 2024-10-09 DIAGNOSIS — R55 COUGH SYNCOPE: ICD-10-CM

## 2024-10-09 DIAGNOSIS — G47.30 SLEEP APNEA, UNSPECIFIED TYPE: ICD-10-CM

## 2024-10-09 DIAGNOSIS — E79.0 HYPERURICEMIA: ICD-10-CM

## 2024-10-09 DIAGNOSIS — E78.2 HYPERLIPIDEMIA, MIXED: ICD-10-CM

## 2024-10-09 DIAGNOSIS — Z94.7 CORNEAL TRANSPLANT STATUS: ICD-10-CM

## 2024-10-09 DIAGNOSIS — E66.3 OVERWEIGHT: ICD-10-CM

## 2024-10-09 DIAGNOSIS — R51.9 NONINTRACTABLE EPISODIC HEADACHE, UNSPECIFIED HEADACHE TYPE: ICD-10-CM

## 2024-10-09 PROCEDURE — 93000 ELECTROCARDIOGRAM COMPLETE: CPT | Performed by: INTERNAL MEDICINE

## 2024-10-09 PROCEDURE — 1036F TOBACCO NON-USER: CPT | Performed by: INTERNAL MEDICINE

## 2024-10-09 PROCEDURE — 3008F BODY MASS INDEX DOCD: CPT | Performed by: INTERNAL MEDICINE

## 2024-10-09 PROCEDURE — 1159F MED LIST DOCD IN RCRD: CPT | Performed by: INTERNAL MEDICINE

## 2024-10-09 PROCEDURE — 1160F RVW MEDS BY RX/DR IN RCRD: CPT | Performed by: INTERNAL MEDICINE

## 2024-10-09 PROCEDURE — 99215 OFFICE O/P EST HI 40 MIN: CPT | Performed by: INTERNAL MEDICINE

## 2024-10-09 PROCEDURE — 3074F SYST BP LT 130 MM HG: CPT | Performed by: INTERNAL MEDICINE

## 2024-10-09 PROCEDURE — 3078F DIAST BP <80 MM HG: CPT | Performed by: INTERNAL MEDICINE

## 2024-10-09 RX ORDER — AMLODIPINE BESYLATE 10 MG/1
10 TABLET ORAL DAILY
Qty: 30 TABLET | Refills: 1 | Status: SHIPPED | OUTPATIENT
Start: 2024-10-09 | End: 2025-10-09

## 2024-10-09 RX ORDER — CARVEDILOL 6.25 MG/1
6.25 TABLET ORAL
Qty: 60 TABLET | Refills: 11 | Status: SHIPPED | OUTPATIENT
Start: 2024-10-09 | End: 2024-10-09 | Stop reason: ALTCHOICE

## 2024-10-09 ASSESSMENT — LIFESTYLE VARIABLES
HOW OFTEN DO YOU HAVE A DRINK CONTAINING ALCOHOL: 2-3 TIMES A WEEK
AUDIT TOTAL SCORE: -1
AUDIT-C TOTAL SCORE: 8
HAS A RELATIVE, FRIEND, DOCTOR, OR ANOTHER HEALTH PROFESSIONAL EXPRESSED CONCERN ABOUT YOUR DRINKING OR SUGGESTED YOU CUT DOWN: NO
HOW MANY STANDARD DRINKS CONTAINING ALCOHOL DO YOU HAVE ON A TYPICAL DAY: 5 OR 6
HAVE YOU OR SOMEONE ELSE BEEN INJURED AS A RESULT OF YOUR DRINKING: NO
HOW OFTEN DO YOU HAVE SIX OR MORE DRINKS ON ONE OCCASION: WEEKLY
SKIP TO QUESTIONS 9-10: 0

## 2024-10-09 ASSESSMENT — PATIENT HEALTH QUESTIONNAIRE - PHQ9
1. LITTLE INTEREST OR PLEASURE IN DOING THINGS: NOT AT ALL
2. FEELING DOWN, DEPRESSED OR HOPELESS: NOT AT ALL
SUM OF ALL RESPONSES TO PHQ9 QUESTIONS 1 AND 2: 0

## 2024-10-09 NOTE — PROGRESS NOTES
Assessment/Plan     This is a 69 years old male who has had an episode of syncope in the bar after drinking about 5 beer.  He also has chronic cough and had a coughing episode prior to the syncope.  On my clinical evaluation it is cough syncope exacerbated by vasodilatation from his alcohol and maybe he might have had some dehydration.  But other causes needs to be excluded.  His EKG in the office shows sinus bradycardia no acute ST-T changes.  Patient's EKG in the EMS vehicle reviewed shows no acute changes but there are some nonspecific changes but the quality is poor.  Patient's orthostatic blood pressure is without any significant changes as documented specifically his blood pressure has no change in his heart rate stable at sinus bradycardia 52 to 55 bpm.  I discussed with the patient about seeing a cardiologist but patient wants to wait till the appropriate investigations are done and further decisions are made.    I had a lengthy discussion with the patient and his friend about the differential diagnosis and the importance of doing the investigations which patient agrees and acknowledges.    Patient will have Holter monitor and 2D echocardiogram done.  Because of his headache will have a CT of the head done.    Patient's chronic cough or feeling of irritation in the throat could be a side effects from the ACE inhibitor even though patient has tolerated the ACE inhibitor for a long time.  Will discontinue the ACE inhibitor and start him on amlodipine.  Beta-blocker will be avoided because of his sinus bradycardia and the current heart rate in the EKG.    Healthy lifestyle was discussed in detail with the patient.  Patient was advised to follow-up with me in 3 weeks to have further review and discussion of his investigations and any other management options.    This is an extensive investigation for his syncope and acute care.  Patient understands if there is any new symptoms he should go to the emergency room  immediately.    Problem List Items Addressed This Visit       Essential hypertension, benign - Primary    Relevant Medications    amLODIPine (Norvasc) 10 mg tablet    Other Relevant Orders    Transthoracic Echo Complete    CBC and Auto Differential    Comprehensive Metabolic Panel    Magnesium    Gastroesophageal reflux disease    Hyperuricemia    Hyperlipidemia, mixed    Relevant Orders    Transthoracic Echo Complete    CBC and Auto Differential    Comprehensive Metabolic Panel    Magnesium    Overweight    Sleep apnea    Corneal transplant status    Cough syncope    Relevant Orders    XR chest 2 views    Transthoracic Echo Complete    ECG 12 Lead    Holter Or Event Cardiac Monitor    CT head wo IV contrast     Other Visit Diagnoses       Nonintractable episodic headache, unspecified headache type        Relevant Orders    CT head wo IV contrast            Subjective     Patient ID: Osman Alaniz is a 69 y.o. male who presents for follow up from passing out this weekend.    History of present illness  69 years old male who is generally active has chronic problems as in the past documentations including hypertension hyperlipidemia and has had eye problems and on eyedrops but generally stable came for an acute visit because of his passing out episode about 3 days ago.    Patient was in a bar where he said he had about 5 drinks of beer and patient also was having some cough for the last 3 to 4 weeks after a episode of upper respite tract infection which resolved in about a week.  The  was around when he started to have a coughing spell and he almost passed out on her when she was trying to talk to him.  He did not fall down but he was made lying down by the people in the bar.  He says that he only remembers coughing and then as per the bystanders he became flushed and later bluish on the face and then he passed out and he was put flat when he woke up.  EMS was called and they did the EKG and checked on him.   He was advised to go to the ER which he refused.  He stayed in the bowel for some time and then he rode his motor bicycle home.    He has had some on and off headaches which has not bothered him in the past but he says he is concerned about the headaches at this time.    He has no blurry visions and his vision is the same.  He had seen the ophthalmologist regularly.    He denies any urine bowel changes.  He has no chest pain or palpitation.  During this episode he has never had any palpitation.  He was feeling irritation in the cough and he feels like there is some lump in the throat which is irritating him.  He has no nausea vomiting.    Since this happened he has been still active doing outside work.    Rest of the review of systems no acute complaints.    Patient's records reviewed shows his coronary artery calcium score is 0.    Family History   Problem Relation Name Age of Onset    Diabetes Mother      Pancreatic cancer Mother      Brain cancer Father        Social History     Socioeconomic History    Marital status:      Spouse name: Not on file    Number of children: Not on file    Years of education: Not on file    Highest education level: Not on file   Occupational History    Not on file   Tobacco Use    Smoking status: Former     Types: Cigarettes    Smokeless tobacco: Never   Vaping Use    Vaping status: Never Used   Substance and Sexual Activity    Alcohol use: Yes    Drug use: Not Currently    Sexual activity: Not on file   Other Topics Concern    Not on file   Social History Narrative    Not on file     Social Determinants of Health     Financial Resource Strain: Not on file   Food Insecurity: No Food Insecurity (10/3/2024)    Hunger Vital Sign     Worried About Running Out of Food in the Last Year: Never true     Ran Out of Food in the Last Year: Never true   Transportation Needs: Not on file   Physical Activity: Not on file   Stress: Not on file   Social Connections: Not on file   Intimate  Partner Violence: Not on file   Housing Stability: Not on file      Doxycycline and Penicillins   Current Outpatient Medications   Medication Sig Dispense Refill    allopurinol (Zyloprim) 100 mg tablet Take 2 tablets (200 mg) by mouth once daily. 180 tablet 1    atorvastatin (Lipitor) 40 mg tablet Take 1 tablet (40 mg) by mouth once daily at bedtime. 90 tablet 1    cholecalciferol (Vitamin D-3) 25 MCG (1000 UT) capsule Take 1 capsule (25 mcg) by mouth 2 times a day.      doxazosin (Cardura) 2 mg tablet Take 1 tablet (2 mg) by mouth once daily. 90 tablet 1    latanoprost (Xalatan) 0.005 % ophthalmic solution Administer 1 drop into the right eye once daily at bedtime. 2.5 mL 6    pantoprazole (ProtoNix) 40 mg EC tablet Take 1 tablet (40 mg) by mouth once daily. 90 tablet 1    polymyxin B sulf-trimethoprim (Polytrim) ophthalmic solution Administer 1 drop into the right eye once daily. 90 day supply 10 mL 2    prednisoLONE acetate (Pred-Forte) 1 % ophthalmic suspension Administer 1 drop into the right eye once daily. 5 mL 3    sildenafil (Viagra) 50 mg tablet Take 1 tablet (50 mg) by mouth once daily as needed for erectile dysfunction. 12 tablet 3    amLODIPine (Norvasc) 10 mg tablet Take 1 tablet (10 mg) by mouth once daily. 30 tablet 1     No current facility-administered medications for this visit.        Objective     Vitals:    10/09/24 0903   BP: 138/74   Pulse: 55   SpO2: 96%        Physical Exam    Moderate obese, well-nourished with no apparent distress. Alert oriented,  Skin: Normal turgor.   Head: Normocephalic, atraumatic.  Eyes: No acute changes, both eyes and cornea has no irritation. No conjunctivitis  No pallor of conjunctivae. Mucous membranes are moist  Neck: Supple. No JVD. No carotid bruit. No thyromegaly. No cervical lymphadenopathy.  No clubbing. Peripheral osteoarthritis noted.  Chest: Vesicular breathing Bilaterally good air entry. No wheezing. No crackles.  Heart: Regular rate and rhythm. S1, S2  positive. No murmur.  Abdomen: Soft and nontender. Bowel sounds are positive. No organomegaly.  Extremities: Bilaterally no pedal pitting edema. Bilaterally 2+ dorsalis pedis pulses.  No calf tenderness. Homans sign is negative.  Neuro Exam: No focal signs. Gait is normal.     Problem List Items Addressed This Visit       Essential hypertension, benign - Primary    Relevant Medications    amLODIPine (Norvasc) 10 mg tablet    Other Relevant Orders    Transthoracic Echo Complete    CBC and Auto Differential    Comprehensive Metabolic Panel    Magnesium    Gastroesophageal reflux disease    Hyperuricemia    Hyperlipidemia, mixed    Relevant Orders    Transthoracic Echo Complete    CBC and Auto Differential    Comprehensive Metabolic Panel    Magnesium    Overweight    Sleep apnea    Corneal transplant status    Cough syncope    Relevant Orders    XR chest 2 views    Transthoracic Echo Complete    ECG 12 Lead    Holter Or Event Cardiac Monitor    CT head wo IV contrast     Other Visit Diagnoses       Nonintractable episodic headache, unspecified headache type        Relevant Orders    CT head wo IV contrast             Orders Placed This Encounter   Procedures    XR chest 2 views     Standing Status:   Future     Standing Expiration Date:   10/9/2025     Order Specific Question:   Reason for exam:     Answer:   cough     Order Specific Question:   Radiologist to Determine Optimal Study     Answer:   Yes     Order Specific Question:   Release result to [facility]     Answer:   Immediate [1]     Order Specific Question:   Is this exam part of a Research Study? If Yes, link this order to the research study     Answer:   No    CT head wo IV contrast     Standing Status:   Future     Standing Expiration Date:   10/9/2025     Order Specific Question:   Reason for exam:     Answer:   syncope, head ache     Order Specific Question:   Radiologist to Determine Optimal Study     Answer:   Yes     Order Specific Question:   Release  result to MyChart     Answer:   Immediate [1]     Order Specific Question:   Is this exam part of a Research Study? If Yes, link this order to the research study     Answer:   No     Order Specific Question:   May utilize  for port access if port present for this exam.     Answer:   Yes    CBC and Auto Differential     Standing Status:   Future     Standing Expiration Date:   10/9/2025     Order Specific Question:   Release result to [facility]     Answer:   Immediate    Comprehensive Metabolic Panel     Standing Status:   Future     Standing Expiration Date:   10/9/2025     Order Specific Question:   Release result to MyChart     Answer:   Immediate    Magnesium     Standing Status:   Future     Standing Expiration Date:   10/9/2025     Order Specific Question:   Release result to [email or web address]     Answer:   Immediate [1]    Holter Or Event Cardiac Monitor     Standing Status:   Future     Standing Expiration Date:   10/9/2025     Order Specific Question:   How many days does patient have to wear monitor?     Answer:   24 Hours-48 Hours     Order Specific Question:   Release result to [facility]     Answer:   Immediate [1]    ECG 12 Lead     Order Specific Question:   Reason for Exam:     Answer:   syncope    Transthoracic Echo Complete     Standing Status:   Future     Standing Expiration Date:   10/9/2026     Order Specific Question:   Reason for exam:     Answer:   syncope     Order Specific Question:   Possible 3D echo?     Answer:   No     Order Specific Question:   Possible strain echo?     Answer:   No     Order Specific Question:   Where is your preferred location to perform this study?     Answer:   First Available        Lab Results   Component Value Date    WBC 5.1 08/31/2022    HGB 14.8 08/31/2022    HCT 45.3 08/31/2022     08/31/2022    CHOL 153 08/31/2022    TRIG 126 08/31/2022    HDL 48.0 08/31/2022    ALT 28 08/31/2022    AST 19 08/31/2022     08/31/2022    K 4.6 08/31/2022      08/31/2022    CREATININE 1.15 08/31/2022    BUN 17 08/31/2022    CO2 29 08/31/2022    TSH 1.84 08/31/2022     Lab Results   Component Value Date    CHOL 153 08/31/2022    CHHDL 3.2 08/31/2022       No results found.

## 2024-10-10 ENCOUNTER — LAB (OUTPATIENT)
Dept: LAB | Facility: LAB | Age: 69
End: 2024-10-10
Payer: MEDICARE

## 2024-10-10 ENCOUNTER — HOSPITAL ENCOUNTER (OUTPATIENT)
Dept: RADIOLOGY | Facility: HOSPITAL | Age: 69
Discharge: HOME | End: 2024-10-10
Payer: MEDICARE

## 2024-10-10 DIAGNOSIS — G47.30 SLEEP APNEA, UNSPECIFIED TYPE: ICD-10-CM

## 2024-10-10 DIAGNOSIS — R05.4 COUGH SYNCOPE: ICD-10-CM

## 2024-10-10 DIAGNOSIS — Z13.29 SCREENING FOR THYROID DISORDER: ICD-10-CM

## 2024-10-10 DIAGNOSIS — E78.2 HYPERLIPIDEMIA, MIXED: ICD-10-CM

## 2024-10-10 DIAGNOSIS — E55.9 VITAMIN D DEFICIENCY: ICD-10-CM

## 2024-10-10 DIAGNOSIS — I10 ESSENTIAL HYPERTENSION, BENIGN: ICD-10-CM

## 2024-10-10 DIAGNOSIS — R55 COUGH SYNCOPE: ICD-10-CM

## 2024-10-10 DIAGNOSIS — E78.2 MIXED HYPERLIPIDEMIA: ICD-10-CM

## 2024-10-10 DIAGNOSIS — E79.0 HYPERURICEMIA: ICD-10-CM

## 2024-10-10 LAB
25(OH)D3 SERPL-MCNC: 42 NG/ML (ref 30–100)
ALBUMIN SERPL BCP-MCNC: 4.2 G/DL (ref 3.4–5)
ALP SERPL-CCNC: 63 U/L (ref 33–136)
ALT SERPL W P-5'-P-CCNC: 43 U/L (ref 10–52)
ANION GAP SERPL CALC-SCNC: 12 MMOL/L (ref 10–20)
AST SERPL W P-5'-P-CCNC: 22 U/L (ref 9–39)
BASOPHILS # BLD AUTO: 0.03 X10*3/UL (ref 0–0.1)
BASOPHILS NFR BLD AUTO: 0.5 %
BILIRUB SERPL-MCNC: 0.4 MG/DL (ref 0–1.2)
BUN SERPL-MCNC: 16 MG/DL (ref 6–23)
CALCIUM SERPL-MCNC: 9.5 MG/DL (ref 8.6–10.3)
CHLORIDE SERPL-SCNC: 109 MMOL/L (ref 98–107)
CHOLEST SERPL-MCNC: 169 MG/DL (ref 0–199)
CHOLESTEROL/HDL RATIO: 3.3
CO2 SERPL-SCNC: 27 MMOL/L (ref 21–32)
CREAT SERPL-MCNC: 1.09 MG/DL (ref 0.5–1.3)
EGFRCR SERPLBLD CKD-EPI 2021: 74 ML/MIN/1.73M*2
EOSINOPHIL # BLD AUTO: 0.19 X10*3/UL (ref 0–0.7)
EOSINOPHIL NFR BLD AUTO: 2.9 %
ERYTHROCYTE [DISTWIDTH] IN BLOOD BY AUTOMATED COUNT: 13.7 % (ref 11.5–14.5)
GLUCOSE SERPL-MCNC: 97 MG/DL (ref 74–99)
HCT VFR BLD AUTO: 44.3 % (ref 41–52)
HDLC SERPL-MCNC: 50.6 MG/DL
HGB BLD-MCNC: 14.5 G/DL (ref 13.5–17.5)
IMM GRANULOCYTES # BLD AUTO: 0.02 X10*3/UL (ref 0–0.7)
IMM GRANULOCYTES NFR BLD AUTO: 0.3 % (ref 0–0.9)
LDLC SERPL CALC-MCNC: 84 MG/DL
LYMPHOCYTES # BLD AUTO: 1.57 X10*3/UL (ref 1.2–4.8)
LYMPHOCYTES NFR BLD AUTO: 24 %
MAGNESIUM SERPL-MCNC: 1.91 MG/DL (ref 1.6–2.4)
MCH RBC QN AUTO: 29.4 PG (ref 26–34)
MCHC RBC AUTO-ENTMCNC: 32.7 G/DL (ref 32–36)
MCV RBC AUTO: 90 FL (ref 80–100)
MONOCYTES # BLD AUTO: 0.57 X10*3/UL (ref 0.1–1)
MONOCYTES NFR BLD AUTO: 8.7 %
NEUTROPHILS # BLD AUTO: 4.17 X10*3/UL (ref 1.2–7.7)
NEUTROPHILS NFR BLD AUTO: 63.6 %
NON HDL CHOLESTEROL: 118 MG/DL (ref 0–149)
NRBC BLD-RTO: 0 /100 WBCS (ref 0–0)
PLATELET # BLD AUTO: 220 X10*3/UL (ref 150–450)
POTASSIUM SERPL-SCNC: 4.5 MMOL/L (ref 3.5–5.3)
PROT SERPL-MCNC: 7.2 G/DL (ref 6.4–8.2)
RBC # BLD AUTO: 4.93 X10*6/UL (ref 4.5–5.9)
SODIUM SERPL-SCNC: 143 MMOL/L (ref 136–145)
TRIGL SERPL-MCNC: 172 MG/DL (ref 0–149)
TSH SERPL-ACNC: 1.87 MIU/L (ref 0.44–3.98)
URATE SERPL-MCNC: 5.5 MG/DL (ref 4–7.5)
VLDL: 34 MG/DL (ref 0–40)
WBC # BLD AUTO: 6.6 X10*3/UL (ref 4.4–11.3)

## 2024-10-10 PROCEDURE — 83735 ASSAY OF MAGNESIUM: CPT

## 2024-10-10 PROCEDURE — 85025 COMPLETE CBC W/AUTO DIFF WBC: CPT

## 2024-10-10 PROCEDURE — 80061 LIPID PANEL: CPT

## 2024-10-10 PROCEDURE — 71046 X-RAY EXAM CHEST 2 VIEWS: CPT

## 2024-10-10 PROCEDURE — 36415 COLL VENOUS BLD VENIPUNCTURE: CPT

## 2024-10-10 PROCEDURE — 71046 X-RAY EXAM CHEST 2 VIEWS: CPT | Performed by: RADIOLOGY

## 2024-10-10 PROCEDURE — 84443 ASSAY THYROID STIM HORMONE: CPT

## 2024-10-10 PROCEDURE — 82306 VITAMIN D 25 HYDROXY: CPT

## 2024-10-10 PROCEDURE — 80053 COMPREHEN METABOLIC PANEL: CPT

## 2024-10-10 PROCEDURE — 84550 ASSAY OF BLOOD/URIC ACID: CPT

## 2024-10-10 NOTE — RESULT ENCOUNTER NOTE
Results are acceptable.  We will review the results in detail during office follow-up and please advise patient to keep the follow-up appointment as scheduled.

## 2024-10-11 ENCOUNTER — TELEPHONE (OUTPATIENT)
Dept: PRIMARY CARE | Facility: CLINIC | Age: 69
End: 2024-10-11
Payer: MEDICARE

## 2024-10-11 NOTE — TELEPHONE ENCOUNTER
----- Message from Marquis Stein sent at 10/10/2024  5:38 PM EDT -----  Results are acceptable.  We will review the results in detail during office follow-up and please advise patient to keep the follow-up appointment as scheduled.

## 2024-10-14 ENCOUNTER — TELEPHONE (OUTPATIENT)
Dept: PRIMARY CARE | Facility: CLINIC | Age: 69
End: 2024-10-14
Payer: MEDICARE

## 2024-10-14 NOTE — TELEPHONE ENCOUNTER
----- Message from Marquis Steni sent at 10/14/2024  8:54 AM EDT -----  Chest x-ray results are acceptable.  We will review the results in detail during office follow-up and please advise patient to keep the follow-up appointment as scheduled.  If patient has no appointment pending please advise patient to make a follow-up appointment as we discussed previously.

## 2024-10-18 ENCOUNTER — APPOINTMENT (OUTPATIENT)
Dept: OPHTHALMOLOGY | Facility: CLINIC | Age: 69
End: 2024-10-18
Payer: MEDICARE

## 2024-10-18 DIAGNOSIS — T86.8409 CORNEAL GRAFT REJECTION: ICD-10-CM

## 2024-10-18 DIAGNOSIS — Z94.7 CORNEAL TRANSPLANT STATUS: Primary | ICD-10-CM

## 2024-10-18 PROCEDURE — V2520 CONTACT LENS HYDROPHILIC: HCPCS | Performed by: OPTOMETRIST

## 2024-10-18 PROCEDURE — 99212 OFFICE O/P EST SF 10 MIN: CPT | Performed by: OPTOMETRIST

## 2024-10-18 ASSESSMENT — ENCOUNTER SYMPTOMS
NEUROLOGICAL NEGATIVE: 0
ENDOCRINE NEGATIVE: 0
CARDIOVASCULAR NEGATIVE: 0
EYES NEGATIVE: 1
ALLERGIC/IMMUNOLOGIC NEGATIVE: 0
GASTROINTESTINAL NEGATIVE: 0
RESPIRATORY NEGATIVE: 0
MUSCULOSKELETAL NEGATIVE: 0
CONSTITUTIONAL NEGATIVE: 0
PSYCHIATRIC NEGATIVE: 0
HEMATOLOGIC/LYMPHATIC NEGATIVE: 0

## 2024-10-18 ASSESSMENT — REFRACTION_CURRENTRX
OD_CYLINDER: SPHERE
OD_DIAMETER: 16.0
OD_SPHERE: -3.00
OD_BASECURVE: 9.8

## 2024-10-18 ASSESSMENT — EXTERNAL EXAM - RIGHT EYE: OD_EXAM: NORMAL

## 2024-10-18 ASSESSMENT — VISUAL ACUITY
OD_CC: 20/200
OS_CC: 20/20
METHOD: SNELLEN - LINEAR

## 2024-10-18 ASSESSMENT — EXTERNAL EXAM - LEFT EYE: OS_EXAM: NORMAL

## 2024-10-18 ASSESSMENT — SLIT LAMP EXAM - LIDS
COMMENTS: NORMAL
COMMENTS: NORMAL

## 2024-10-18 NOTE — PROGRESS NOTES
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

## 2024-10-18 NOTE — Clinical Note
Right eye (OD) Contact Lens Order  Quantity: 1 Package: 4 P Appointment needed? Yes Medically necessary? Yes Ship To: [facility] Additional instructions: leave 4 pack at [facility] for next appt

## 2024-10-31 ENCOUNTER — HOSPITAL ENCOUNTER (OUTPATIENT)
Dept: RADIOLOGY | Facility: HOSPITAL | Age: 69
Discharge: HOME | End: 2024-10-31
Payer: MEDICARE

## 2024-10-31 DIAGNOSIS — R55 COUGH SYNCOPE: ICD-10-CM

## 2024-10-31 DIAGNOSIS — R51.9 NONINTRACTABLE EPISODIC HEADACHE, UNSPECIFIED HEADACHE TYPE: ICD-10-CM

## 2024-10-31 DIAGNOSIS — R05.4 COUGH SYNCOPE: ICD-10-CM

## 2024-10-31 PROCEDURE — 70450 CT HEAD/BRAIN W/O DYE: CPT

## 2024-10-31 PROCEDURE — 70450 CT HEAD/BRAIN W/O DYE: CPT | Performed by: RADIOLOGY

## 2024-11-01 ENCOUNTER — APPOINTMENT (OUTPATIENT)
Dept: CARDIOLOGY | Facility: CLINIC | Age: 69
End: 2024-11-01
Payer: MEDICARE

## 2024-11-01 ENCOUNTER — HOSPITAL ENCOUNTER (OUTPATIENT)
Dept: CARDIOLOGY | Facility: CLINIC | Age: 69
Discharge: HOME | End: 2024-11-01
Payer: MEDICARE

## 2024-11-01 ENCOUNTER — TELEPHONE (OUTPATIENT)
Dept: CARDIOLOGY | Facility: CLINIC | Age: 69
End: 2024-11-01

## 2024-11-01 VITALS
DIASTOLIC BLOOD PRESSURE: 78 MMHG | SYSTOLIC BLOOD PRESSURE: 142 MMHG | HEIGHT: 72 IN | WEIGHT: 203 LBS | BODY MASS INDEX: 27.5 KG/M2

## 2024-11-01 DIAGNOSIS — R05.4 COUGH SYNCOPE: ICD-10-CM

## 2024-11-01 DIAGNOSIS — E78.2 HYPERLIPIDEMIA, MIXED: ICD-10-CM

## 2024-11-01 DIAGNOSIS — R55 COUGH SYNCOPE: ICD-10-CM

## 2024-11-01 DIAGNOSIS — I10 ESSENTIAL HYPERTENSION, BENIGN: ICD-10-CM

## 2024-11-01 LAB
AORTIC VALVE MEAN GRADIENT: 6 MMHG
AORTIC VALVE PEAK VELOCITY: 1.51 M/S
AV PEAK GRADIENT: 9 MMHG
AVA (PEAK VEL): 2.7 CM2
AVA (VTI): 2.74 CM2
BODY SURFACE AREA: 2.16 M2
EJECTION FRACTION APICAL 4 CHAMBER: 49.1
EJECTION FRACTION: 65 %
LEFT ATRIUM VOLUME AREA LENGTH INDEX BSA: 12.2 ML/M2
LEFT VENTRICLE INTERNAL DIMENSION DIASTOLE: 5.25 CM (ref 3.5–6)
LEFT VENTRICULAR OUTFLOW TRACT DIAMETER: 2.12 CM
LV EJECTION FRACTION BIPLANE: 65 %
MITRAL VALVE E/A RATIO: 0.81

## 2024-11-01 PROCEDURE — 93306 TTE W/DOPPLER COMPLETE: CPT | Performed by: INTERNAL MEDICINE

## 2024-11-01 PROCEDURE — 93306 TTE W/DOPPLER COMPLETE: CPT

## 2024-11-01 NOTE — TELEPHONE ENCOUNTER
----- Message from Marquis Stein sent at 10/31/2024  4:21 PM EDT -----  CT of the head does not show any acute or significant abnormalities in the brain.  There is mild sinus thickening.  Will review and discuss results during office visit and advised the patient to continue the same medications and follow-up as scheduled.

## 2024-11-04 NOTE — TELEPHONE ENCOUNTER
----- Message from Marquis Stein sent at 11/1/2024 10:50 PM EDT -----  Echocardiogram shows no significant abnormalities.  Will review and discuss results during the office follow-up.  Please advise patient to continue the same management.

## 2024-11-07 ENCOUNTER — TELEPHONE (OUTPATIENT)
Dept: PRIMARY CARE | Facility: CLINIC | Age: 69
End: 2024-11-07
Payer: MEDICARE

## 2024-11-07 LAB — BODY SURFACE AREA: 2.16 M2

## 2024-11-07 NOTE — TELEPHONE ENCOUNTER
----- Message from Marquis Stein sent at 11/7/2024  3:56 PM EST -----  24-hour Holter shows no significant arrhythmias.  Please advise patient to continue the same medication and management and follow-up as discussed.

## 2024-11-13 ENCOUNTER — OFFICE VISIT (OUTPATIENT)
Dept: PRIMARY CARE | Facility: CLINIC | Age: 69
End: 2024-11-13
Payer: MEDICARE

## 2024-11-13 ENCOUNTER — APPOINTMENT (OUTPATIENT)
Dept: PRIMARY CARE | Facility: CLINIC | Age: 69
End: 2024-11-13
Payer: MEDICARE

## 2024-11-13 VITALS
SYSTOLIC BLOOD PRESSURE: 135 MMHG | WEIGHT: 208 LBS | DIASTOLIC BLOOD PRESSURE: 75 MMHG | HEIGHT: 72 IN | BODY MASS INDEX: 28.17 KG/M2 | HEART RATE: 65 BPM

## 2024-11-13 DIAGNOSIS — I10 ESSENTIAL HYPERTENSION, BENIGN: Primary | ICD-10-CM

## 2024-11-13 DIAGNOSIS — E78.2 HYPERLIPIDEMIA, MIXED: ICD-10-CM

## 2024-11-13 DIAGNOSIS — R22.43 LOCALIZED SWELLING OF BOTH LOWER LEGS: ICD-10-CM

## 2024-11-13 PROBLEM — E66.3 OVERWEIGHT: Status: RESOLVED | Noted: 2023-08-15 | Resolved: 2024-11-13

## 2024-11-13 PROCEDURE — 1160F RVW MEDS BY RX/DR IN RCRD: CPT | Performed by: INTERNAL MEDICINE

## 2024-11-13 PROCEDURE — 3075F SYST BP GE 130 - 139MM HG: CPT | Performed by: INTERNAL MEDICINE

## 2024-11-13 PROCEDURE — 1036F TOBACCO NON-USER: CPT | Performed by: INTERNAL MEDICINE

## 2024-11-13 PROCEDURE — 90662 IIV NO PRSV INCREASED AG IM: CPT | Performed by: INTERNAL MEDICINE

## 2024-11-13 PROCEDURE — G2211 COMPLEX E/M VISIT ADD ON: HCPCS | Performed by: INTERNAL MEDICINE

## 2024-11-13 PROCEDURE — 3008F BODY MASS INDEX DOCD: CPT | Performed by: INTERNAL MEDICINE

## 2024-11-13 PROCEDURE — 3078F DIAST BP <80 MM HG: CPT | Performed by: INTERNAL MEDICINE

## 2024-11-13 PROCEDURE — G0008 ADMIN INFLUENZA VIRUS VAC: HCPCS | Performed by: INTERNAL MEDICINE

## 2024-11-13 PROCEDURE — 99214 OFFICE O/P EST MOD 30 MIN: CPT | Performed by: INTERNAL MEDICINE

## 2024-11-13 PROCEDURE — 1159F MED LIST DOCD IN RCRD: CPT | Performed by: INTERNAL MEDICINE

## 2024-11-13 RX ORDER — LOSARTAN POTASSIUM AND HYDROCHLOROTHIAZIDE 12.5; 5 MG/1; MG/1
1 TABLET ORAL DAILY
Qty: 30 TABLET | Refills: 11 | Status: SHIPPED | OUTPATIENT
Start: 2024-11-13 | End: 2025-11-13

## 2024-11-13 RX ORDER — DOXAZOSIN 2 MG/1
2 TABLET ORAL 2 TIMES DAILY
Qty: 180 TABLET | Refills: 1 | Status: SHIPPED | OUTPATIENT
Start: 2024-11-13 | End: 2025-11-13

## 2024-11-13 ASSESSMENT — LIFESTYLE VARIABLES
AUDIT-C TOTAL SCORE: 1
HOW OFTEN DO YOU HAVE SIX OR MORE DRINKS ON ONE OCCASION: NEVER
HOW MANY STANDARD DRINKS CONTAINING ALCOHOL DO YOU HAVE ON A TYPICAL DAY: 1 OR 2
SKIP TO QUESTIONS 9-10: 1
HOW OFTEN DO YOU HAVE A DRINK CONTAINING ALCOHOL: MONTHLY OR LESS
HAS A RELATIVE, FRIEND, DOCTOR, OR ANOTHER HEALTH PROFESSIONAL EXPRESSED CONCERN ABOUT YOUR DRINKING OR SUGGESTED YOU CUT DOWN: NO
AUDIT TOTAL SCORE: 1
HAVE YOU OR SOMEONE ELSE BEEN INJURED AS A RESULT OF YOUR DRINKING: NO

## 2024-11-13 ASSESSMENT — PATIENT HEALTH QUESTIONNAIRE - PHQ9
2. FEELING DOWN, DEPRESSED OR HOPELESS: NOT AT ALL
SUM OF ALL RESPONSES TO PHQ9 QUESTIONS 1 AND 2: 0
1. LITTLE INTEREST OR PLEASURE IN DOING THINGS: NOT AT ALL

## 2024-11-13 NOTE — PROGRESS NOTES
Assessment/Plan     Patient's chronic problems are reviewed and discussed as noted and acute problems are managed and he will be followed up in 3 to 4 weeks.    Problem List Items Addressed This Visit       Essential hypertension, benign - Primary     His blood pressure is borderline controlled.  His medications were readjusted during the last visit because of several reasons which are discussed including his cough which was thought to be with ACE inhibitor's.  Now he has swellings of the legs probably due to amlodipine.  Amlodipine will be discontinued and doxazosin will be increased to twice a day.  He will be started on losartan hydrochlorothiazide to be taken in the morning.  Side effects benefits risk were reviewed and discussed.  Electrolytes and renal function will be followed up before his next visit in 3 weeks.         Relevant Medications    losartan-hydrochlorothiazide (Hyzaar) 50-12.5 mg tablet    doxazosin (Cardura) 2 mg tablet    Hyperlipidemia, mixed     Patient will continue the atorvastatin and his lipid profile is acceptable.         Relevant Orders    CBC and Auto Differential    Comprehensive Metabolic Panel    Localized swelling of both lower legs     No evidence of generalized swelling or CHF.  Clinically this is due to amlodipine.  Will change the amlodipine to different antihypertensive medication and also he will be started on a small dose of diuretics.  Patient's 2D echocardiogram was reviewed and discussed.  Salt restriction was discussed.         Relevant Orders    CBC and Auto Differential    Comprehensive Metabolic Panel       Subjective     Patient ID: Osman Alaniz is a 69 y.o. male who presents for Results.    History of present illness  69 years old male who is generally active has had an episode of syncope and was investigated with CT scan of the head and the labs came for a follow-up visit.    Since his last visit he says he is doing well.  He has had no episodes of any dizziness  or syncope.  His headache has improved.  He has bilateral leg swellings since he started the new blood pressure medication.  But his cough is completely resolved.  He brought me his blood pressure readings from home which runs slightly higher with the systolic blood pressure around 145 and the diastolic around 88.    He is trying to watch diet.  He denies any diarrhea or bowel symptoms.  He has some frequent urination even during the daytime.    Rest of the review of systems no acute complaints.    Family History   Problem Relation Name Age of Onset    Diabetes Mother      Pancreatic cancer Mother      Brain cancer Father        Social History     Socioeconomic History    Marital status: Single     Spouse name: Not on file    Number of children: Not on file    Years of education: Not on file    Highest education level: Not on file   Occupational History    Not on file   Tobacco Use    Smoking status: Former     Types: Cigarettes    Smokeless tobacco: Never   Vaping Use    Vaping status: Never Used   Substance and Sexual Activity    Alcohol use: Yes    Drug use: Never    Sexual activity: Not on file   Other Topics Concern    Not on file   Social History Narrative    Not on file     Social Drivers of Health     Financial Resource Strain: Not on file   Food Insecurity: No Food Insecurity (10/3/2024)    Hunger Vital Sign     Worried About Running Out of Food in the Last Year: Never true     Ran Out of Food in the Last Year: Never true   Transportation Needs: Not on file   Physical Activity: Not on file   Stress: Not on file   Social Connections: Not on file   Intimate Partner Violence: Not on file   Housing Stability: Not on file      Doxycycline and Penicillins   Current Outpatient Medications   Medication Sig Dispense Refill    allopurinol (Zyloprim) 100 mg tablet Take 2 tablets (200 mg) by mouth once daily. 180 tablet 1    amLODIPine (Norvasc) 10 mg tablet Take 1 tablet (10 mg) by mouth once daily. 30 tablet 1     atorvastatin (Lipitor) 40 mg tablet Take 1 tablet (40 mg) by mouth once daily at bedtime. 90 tablet 1    cholecalciferol (Vitamin D-3) 25 MCG (1000 UT) capsule Take 1 capsule (25 mcg) by mouth 2 times a day.      latanoprost (Xalatan) 0.005 % ophthalmic solution Administer 1 drop into the right eye once daily at bedtime. 2.5 mL 6    polymyxin B sulf-trimethoprim (Polytrim) ophthalmic solution Administer 1 drop into the right eye once daily. 90 day supply 10 mL 2    prednisoLONE acetate (Pred-Forte) 1 % ophthalmic suspension Administer 1 drop into the right eye once daily. 5 mL 3    sildenafil (Viagra) 50 mg tablet Take 1 tablet (50 mg) by mouth once daily as needed for erectile dysfunction. 12 tablet 3    doxazosin (Cardura) 2 mg tablet Take 1 tablet (2 mg) by mouth 2 times a day. 180 tablet 1    losartan-hydrochlorothiazide (Hyzaar) 50-12.5 mg tablet Take 1 tablet by mouth once daily. 30 tablet 11     No current facility-administered medications for this visit.      Objective     Vitals:    11/13/24 1431   BP: 135/75   Pulse: 65        Physical Exam  Moderate obese, well-nourished with no apparent distress. Alert oriented,  Skin: Normal turgor.   Head: Normocephalic, atraumatic.  Eyes: No acute changes, both eyes and cornea has no irritation. No conjunctivitis  No pallor of conjunctivae. Mucous membranes are moist  Neck: Supple. No JVD. No carotid bruit. No thyromegaly. No cervical lymphadenopathy.  No clubbing. Peripheral osteoarthritis noted.  Chest: Vesicular breathing Bilaterally good air entry. No wheezing. No crackles.  Heart: Regular rate and rhythm. S1, S2 positive. No murmur.  Abdomen: Soft and nontender. Bowel sounds are positive. No organomegaly.  Extremities: Bilaterally has leg swellings left more than the right. Bilaterally 2+ dorsalis pedis pulses.  No calf tenderness. Homans sign is negative.  Neuro Exam: No focal signs. Gait is normal.         Problem List Items Addressed This Visit       Essential  hypertension, benign - Primary     His blood pressure is borderline controlled.  His medications were readjusted during the last visit because of several reasons which are discussed including his cough which was thought to be with ACE inhibitor's.  Now he has swellings of the legs probably due to amlodipine.  Amlodipine will be discontinued and doxazosin will be increased to twice a day.  He will be started on losartan hydrochlorothiazide to be taken in the morning.  Side effects benefits risk were reviewed and discussed.  Electrolytes and renal function will be followed up before his next visit in 3 weeks.         Relevant Medications    losartan-hydrochlorothiazide (Hyzaar) 50-12.5 mg tablet    doxazosin (Cardura) 2 mg tablet    Hyperlipidemia, mixed     Patient will continue the atorvastatin and his lipid profile is acceptable.         Relevant Orders    CBC and Auto Differential    Comprehensive Metabolic Panel    Localized swelling of both lower legs     No evidence of generalized swelling or CHF.  Clinically this is due to amlodipine.  Will change the amlodipine to different antihypertensive medication and also he will be started on a small dose of diuretics.  Patient's 2D echocardiogram was reviewed and discussed.  Salt restriction was discussed.         Relevant Orders    CBC and Auto Differential    Comprehensive Metabolic Panel        Orders Placed This Encounter   Procedures    Flu vaccine, trivalent, preservative free, HIGH-DOSE, age 65y+ (Fluzone)    CBC and Auto Differential     Standing Status:   Future     Standing Expiration Date:   11/13/2025     Order Specific Question:   Release result to [facility]     Answer:   Immediate    Comprehensive Metabolic Panel     Standing Status:   Future     Standing Expiration Date:   3/13/2025     Order Specific Question:   Release result to [facility]     Answer:   Immediate        Lab Results   Component Value Date    WBC 6.6 10/10/2024    HGB 14.5 10/10/2024    HCT  44.3 10/10/2024     10/10/2024    CHOL 169 10/10/2024    TRIG 172 (H) 10/10/2024    HDL 50.6 10/10/2024    ALT 43 10/10/2024    AST 22 10/10/2024     10/10/2024    K 4.5 10/10/2024     (H) 10/10/2024    CREATININE 1.09 10/10/2024    BUN 16 10/10/2024    CO2 27 10/10/2024    TSH 1.87 10/10/2024     Lab Results   Component Value Date    CHOL 169 10/10/2024    LDLCALC 84 10/10/2024    CHHDL 3.3 10/10/2024       Holter Or Event Cardiac Monitor    Result Date: 11/7/2024  This is a 24 hour Holter report Ordered by PCP for Palpitation. There is no diary entry by the patient during this Holter. Patient's 24 hour Holter tracing review shows patient was in sinus rhythm throughout the Holter. Patient's average heart rate was 82 bpm. Patient's maximum heart rate was 143 bpm at 10:41 AM. Patient's minimum heart rate was 54 bpm at 4:54 AM. Patient had  total of 687 ventricular ectopic beats throughout this Holter which is 0.6% of the total heart rate.  628 of them are single PVCs.  Rest of the 59 single ventricular ectopics.  No sustained ventricular tachycardia. Patient had total of 24 supraventricular ectopic beats which is 0% of the total heart rate.  20 single PACs and 2 atrial pairs.  No sustained supraventricular beats or tachycardia. Patient had no significant pauses with the longest NN interval was 1.3 second at 4:50 AM no R on T noted. Impression #1 no symptoms recorded during this Holter. #2 patient remained in sinus rhythm throughout the Holter. #3 patient had normal diurnal variation in his heart rate with the lowest heart rate between 9 PM to 5 AM #4  patient had no sustained ventricular or supraventricular tachycardia. Marquis Stein M.D., FACP, DABVLM, [facility]. [facility] / [facility], [place] [address].    Transthoracic Echo Complete    Result Date: 11/1/2024                  [address], [address], [place] [address]             Tel [phone] Fax [phone] TRANSTHORACIC ECHOCARDIOGRAM REPORT Patient Name:         JOSH AMEZQUITA        Reading Physician:    [id number] Janae Bautista MD Study Date:           11/1/2024            Ordering Provider:    [id number] HOLDEN HOFFMANN MRN/PID:              [id number]             Fellow: Accession#:           [id number]         Nurse: Date of Birth/Age:    1955 / 69 years Sonographer:          Kristina Simpson                                                                  RDMS, RCS, RVS Gender Assigned at                        Additional Staff: Birth: Height:               182.88 cm            Admit Date: Weight:               92.08 kg             Admission Status: BSA / BMI:            2.14 m2 / 27.53      Department Location:  [facility]                       kg/m2                                      [facility] Blood Pressure: 142 /78 mmHg Study Type:    TRANSTHORACIC ECHO (TTE) COMPLETE Diagnosis/ICD: Syncope and collapse-R55; Essential (primary) hypertension-I10 Indication:    Cough syncope, HTN, HLD, Bradycardia CPT Codes:     Echo Complete w Full [id number]  Study Detail: The following Echo studies were performed: 2D, M-Mode, Doppler and               color flow.  PHYSICIAN INTERPRETATION: Left Ventricle: Left ventricular ejection fraction is normal, calculated by Diehl's biplane at 65%. There is eccentric left ventricular hypertrophy. There are no regional wall motion abnormalities. The left ventricular cavity size is normal. There is normal septal and normal posterior left ventricular wall thickness. Spectral Doppler shows a Grade I (impaired relaxation pattern) of left ventricular diastolic filling with normal left atrial filling pressure. Left Atrium: The left atrium is normal in size. There is no atrial septal defect present. Right Ventricle:  The right ventricle is normal in size. There is normal right ventricular global systolic function. Right Atrium: The right atrium is normal in size. Aortic Valve: The aortic valve is trileaflet. There is no evidence of aortic valve stenosis. The aortic valve dimensionless index is 0.78. There is no evidence of aortic valve regurgitation. The peak instantaneous gradient of the aortic valve is 9 mmHg. The mean gradient of the aortic valve is 6 mmHg. Mitral Valve: The mitral valve is normal in structure. There is no evidence of mitral valve stenosis. There is trace mitral valve regurgitation. Tricuspid Valve: The tricuspid valve is structurally normal. There is no evidence of tricuspid valve stenosis. There is trace tricuspid regurgitation. The right ventricular systolic pressure is unable to be estimated. Pulmonic Valve: The pulmonic valve is structurally normal. There is trace pulmonic valve regurgitation. Pericardium: No pericardial effusion noted. Aorta: The aortic root is normal. Systemic Veins: The inferior vena cava appears normal in size, with IVC inspiratory collapse greater than 50%. In comparison to the previous echocardiogram(s): There are no prior studies on this patient for comparison purposes.  CONCLUSIONS:  1. Left ventricular ejection fraction is normal, calculated by Diehl's biplane at 65%.  2. There is normal right ventricular global systolic function.  3. There is No tricuspid stenosis.  4. Aortic valve stenosis is not present. QUANTITATIVE DATA SUMMARY:  2D MEASUREMENTS:           Normal Ranges: LAs:             3.48 cm   (2.7-4.0cm) RVIDd:           2.46 cm   (0.9-3.6cm) IVSd:            0.93 cm   (0.6-1.1cm) LVPWd:           0.85 cm   (0.6-1.1cm) LVIDd:           5.25 cm   (3.9-5.9cm) LVIDs:           3.21 cm LV Mass Index:   78.7 g/m2 LV % FS          38.8 %  LA VOLUME:                   Normal Ranges: LA Vol A4C:       27.0 ml    (22+/-6mL/m2) LA Vol A2C:       25.4 ml LA Vol BP:        26.2  ml LA Vol Index A4C: 12.6ml/m2 LA Vol Index A2C: 11.8 ml/m2 LA Vol Index BP:  12.2 ml/m2 LA Vol A4C:       25.3 ml LA Vol A2C:       24.3 ml LA Vol Index BSA: 11.6 ml/m2  LV SYSTOLIC FUNCTION BY 2D PLANIMETRY (MOD):                      Normal Ranges: EF-A4C View:    49 % (>=55%) EF-A2C View:    74 % EF-Biplane:     65 % LV EF Reported: 65 %  LV DIASTOLIC FUNCTION:           Normal Ranges: MV Peak E:             0.79 m/s  (0.7-1.2 m/s) MV Peak A:             0.97 m/s  (0.42-0.7 m/s) E/A Ratio:             0.81      (1.0-2.2) MV e'                  0.106 m/s (>8.0) MV lateral e'          0.10 m/s MV medial e'           0.11 m/s E/e' Ratio:            7.47      (<8.0)  MITRAL VALVE:          Normal Ranges: MV DT:        153 msec (150-240msec)  AORTIC VALVE:                     Normal Ranges: AoV Vmax:                1.51 m/s (<=1.7m/s) AoV Peak P.1 mmHg (<20mmHg) AoV Mean P.2 mmHg (1.7-11.5mmHg) LVOT Max Dalton:            1.16 m/s (<=1.1m/s) AoV VTI:                 31.73 cm (18-25cm) LVOT VTI:                24.68 cm LVOT Diameter:           2.12 cm  (1.8-2.4cm) AoV Area, VTI:           2.74 cm2 (2.5-5.5cm2) AoV Area,Vmax:           2.70 cm2 (2.5-4.5cm2) AoV Dimensionless Index: 0.78  RIGHT VENTRICLE: RV Major 6.0 cm  TRICUSPID VALVE/RVSP:         Normal Ranges: IVC Diam:             1.88 cm  PULMONIC VALVE:          Normal Ranges: PV Max Dalton:     0.9 m/s  (0.6-0.9m/s) PV Max PG:      3.0 mmHg  AORTA: Asc Ao Diam 2.97 cm  [id number] Janae Bautista MD Electronically signed on 2024 at 10:13:42 PM  ** Final **     CT head wo IV contrast    Result Date: 10/31/2024  Interpreted By:  Lexus Mendoza and Hooper Grayson STUDY: [id number] CT HEAD WO IV CONTRAST   INDICATION: Signs/Symptoms:syncope, head ache   ACCESSION NUMBER(S): [id number]   ORDERING CLINICIAN: HOLDEN HOFFMANN   TECHNIQUE: Noncontrast helical CT of the head was performed. Multiplanar reformations in bone and soft tissue  algorithm were provided.   FINDINGS: Empty sella noted. Otherwise, midline brain structures appear normal on mid sagittal imaging.   There is no loss of gray-white differentiation.   No evidence of acute intracranial hemorrhage.   No intracranial mass or mass effect.   No midline shift or herniation.   No ventriculomegaly. Normal appearance of the basilar cisterns.   Mucosal thickening observed in the sphenoid sinus and bilateral maxillary sinuses, right worse than left. Right-sided metallic lens implant and scleral band observed. Normal appearance of the left orbit and globe. No mastoid effusion. No acute or aggressive appearing calvarial lesion.       No CT evidence of acute intracranial hemorrhage, mass, or mass effect.   I personally reviewed the images/study and I agree with the findings as stated. This study was interpreted at [facility], [place], [place].   Signed by: Lexus Mendoza 10/31/2024 4:03 PM Dictation workstation:   [id number]

## 2024-11-13 NOTE — ASSESSMENT & PLAN NOTE
No evidence of generalized swelling or CHF.  Clinically this is due to amlodipine.  Will change the amlodipine to different antihypertensive medication and also he will be started on a small dose of diuretics.  Patient's 2D echocardiogram was reviewed and discussed.  Salt restriction was discussed.

## 2024-11-13 NOTE — ASSESSMENT & PLAN NOTE
Discussed in detail the importance of weight loss.  Patient has 8 pound weight gain recently.  He will be started on small dose of diuretic and will be followed up.

## 2024-11-13 NOTE — ASSESSMENT & PLAN NOTE
His blood pressure is borderline controlled.  His medications were readjusted during the last visit because of several reasons which are discussed including his cough which was thought to be with ACE inhibitor's.  Now he has swellings of the legs probably due to amlodipine.  Amlodipine will be discontinued and doxazosin will be increased to twice a day.  He will be started on losartan hydrochlorothiazide to be taken in the morning.  Side effects benefits risk were reviewed and discussed.  Electrolytes and renal function will be followed up before his next visit in 3 weeks.

## 2024-12-05 ENCOUNTER — APPOINTMENT (OUTPATIENT)
Dept: PRIMARY CARE | Facility: CLINIC | Age: 69
End: 2024-12-05
Payer: MEDICARE

## 2024-12-12 ENCOUNTER — LAB (OUTPATIENT)
Dept: LAB | Facility: LAB | Age: 69
End: 2024-12-12
Payer: MEDICARE

## 2024-12-12 DIAGNOSIS — R22.43 LOCALIZED SWELLING OF BOTH LOWER LEGS: ICD-10-CM

## 2024-12-12 DIAGNOSIS — E78.2 HYPERLIPIDEMIA, MIXED: ICD-10-CM

## 2024-12-12 LAB
ALBUMIN SERPL BCP-MCNC: 4 G/DL (ref 3.4–5)
ALP SERPL-CCNC: 69 U/L (ref 33–136)
ALT SERPL W P-5'-P-CCNC: 34 U/L (ref 10–52)
ANION GAP SERPL CALC-SCNC: 11 MMOL/L (ref 10–20)
AST SERPL W P-5'-P-CCNC: 20 U/L (ref 9–39)
BASOPHILS # BLD AUTO: 0.06 X10*3/UL (ref 0–0.1)
BASOPHILS NFR BLD AUTO: 1 %
BILIRUB SERPL-MCNC: 0.5 MG/DL (ref 0–1.2)
BUN SERPL-MCNC: 18 MG/DL (ref 6–23)
CALCIUM SERPL-MCNC: 9.3 MG/DL (ref 8.6–10.3)
CHLORIDE SERPL-SCNC: 105 MMOL/L (ref 98–107)
CO2 SERPL-SCNC: 31 MMOL/L (ref 21–32)
CREAT SERPL-MCNC: 1.17 MG/DL (ref 0.5–1.3)
EGFRCR SERPLBLD CKD-EPI 2021: 68 ML/MIN/1.73M*2
EOSINOPHIL # BLD AUTO: 0.32 X10*3/UL (ref 0–0.7)
EOSINOPHIL NFR BLD AUTO: 5.3 %
ERYTHROCYTE [DISTWIDTH] IN BLOOD BY AUTOMATED COUNT: 12.8 % (ref 11.5–14.5)
GLUCOSE SERPL-MCNC: 100 MG/DL (ref 74–99)
HCT VFR BLD AUTO: 44.7 % (ref 41–52)
HGB BLD-MCNC: 14.6 G/DL (ref 13.5–17.5)
IMM GRANULOCYTES # BLD AUTO: 0.01 X10*3/UL (ref 0–0.7)
IMM GRANULOCYTES NFR BLD AUTO: 0.2 % (ref 0–0.9)
LYMPHOCYTES # BLD AUTO: 1.85 X10*3/UL (ref 1.2–4.8)
LYMPHOCYTES NFR BLD AUTO: 30.4 %
MCH RBC QN AUTO: 29.1 PG (ref 26–34)
MCHC RBC AUTO-ENTMCNC: 32.7 G/DL (ref 32–36)
MCV RBC AUTO: 89 FL (ref 80–100)
MONOCYTES # BLD AUTO: 0.58 X10*3/UL (ref 0.1–1)
MONOCYTES NFR BLD AUTO: 9.5 %
NEUTROPHILS # BLD AUTO: 3.27 X10*3/UL (ref 1.2–7.7)
NEUTROPHILS NFR BLD AUTO: 53.6 %
NRBC BLD-RTO: 0 /100 WBCS (ref 0–0)
PLATELET # BLD AUTO: 201 X10*3/UL (ref 150–450)
POTASSIUM SERPL-SCNC: 4.7 MMOL/L (ref 3.5–5.3)
PROT SERPL-MCNC: 7.1 G/DL (ref 6.4–8.2)
RBC # BLD AUTO: 5.02 X10*6/UL (ref 4.5–5.9)
SODIUM SERPL-SCNC: 142 MMOL/L (ref 136–145)
WBC # BLD AUTO: 6.1 X10*3/UL (ref 4.4–11.3)

## 2024-12-12 PROCEDURE — 85025 COMPLETE CBC W/AUTO DIFF WBC: CPT

## 2024-12-12 PROCEDURE — 80053 COMPREHEN METABOLIC PANEL: CPT

## 2024-12-12 PROCEDURE — 36415 COLL VENOUS BLD VENIPUNCTURE: CPT

## 2024-12-12 NOTE — RESULT ENCOUNTER NOTE
Results are acceptable.  We will review the results in detail during office follow-up and please advise patient to keep the follow-up appointment as scheduled.  If patient has no appointment pending please advise patient to make a follow-up appointment as we discussed previously.

## 2024-12-13 ENCOUNTER — TELEPHONE (OUTPATIENT)
Dept: PRIMARY CARE | Facility: CLINIC | Age: 69
End: 2024-12-13
Payer: MEDICARE

## 2024-12-13 NOTE — TELEPHONE ENCOUNTER
----- Message from Marquis Stein sent at 12/12/2024  3:58 PM EST -----  Results are acceptable.  We will review the results in detail during office follow-up and please advise patient to keep the follow-up appointment as scheduled.  If patient has no appointment pending please advise patient to make a follow-up appointment as we discussed previously.

## 2024-12-15 DIAGNOSIS — K21.9 GASTROESOPHAGEAL REFLUX DISEASE WITHOUT ESOPHAGITIS: Primary | ICD-10-CM

## 2024-12-18 RX ORDER — PANTOPRAZOLE SODIUM 40 MG/1
40 TABLET, DELAYED RELEASE ORAL DAILY
Qty: 90 TABLET | Refills: 0 | Status: SHIPPED | OUTPATIENT
Start: 2024-12-18 | End: 2024-12-19 | Stop reason: SDUPTHER

## 2024-12-19 ENCOUNTER — APPOINTMENT (OUTPATIENT)
Dept: PRIMARY CARE | Facility: CLINIC | Age: 69
End: 2024-12-19
Payer: MEDICARE

## 2024-12-19 VITALS
HEIGHT: 72 IN | DIASTOLIC BLOOD PRESSURE: 79 MMHG | HEART RATE: 79 BPM | WEIGHT: 208 LBS | BODY MASS INDEX: 28.17 KG/M2 | SYSTOLIC BLOOD PRESSURE: 127 MMHG

## 2024-12-19 DIAGNOSIS — E79.0 HYPERURICEMIA: ICD-10-CM

## 2024-12-19 DIAGNOSIS — E66.3 OVERWEIGHT (BMI 25.0-29.9): ICD-10-CM

## 2024-12-19 DIAGNOSIS — I10 ESSENTIAL HYPERTENSION, BENIGN: Primary | ICD-10-CM

## 2024-12-19 DIAGNOSIS — R73.9 ELEVATED BLOOD SUGAR: ICD-10-CM

## 2024-12-19 DIAGNOSIS — K21.9 GASTROESOPHAGEAL REFLUX DISEASE WITHOUT ESOPHAGITIS: ICD-10-CM

## 2024-12-19 DIAGNOSIS — Z87.891 FORMER SMOKER: ICD-10-CM

## 2024-12-19 DIAGNOSIS — E78.2 HYPERLIPIDEMIA, MIXED: ICD-10-CM

## 2024-12-19 DIAGNOSIS — G47.30 SLEEP APNEA, UNSPECIFIED TYPE: ICD-10-CM

## 2024-12-19 DIAGNOSIS — E55.9 VITAMIN D DEFICIENCY: ICD-10-CM

## 2024-12-19 PROBLEM — R22.43 LOCALIZED SWELLING OF BOTH LOWER LEGS: Status: RESOLVED | Noted: 2024-11-13 | Resolved: 2024-12-19

## 2024-12-19 PROCEDURE — 3078F DIAST BP <80 MM HG: CPT | Performed by: INTERNAL MEDICINE

## 2024-12-19 PROCEDURE — 99213 OFFICE O/P EST LOW 20 MIN: CPT | Performed by: INTERNAL MEDICINE

## 2024-12-19 PROCEDURE — 1159F MED LIST DOCD IN RCRD: CPT | Performed by: INTERNAL MEDICINE

## 2024-12-19 PROCEDURE — 3008F BODY MASS INDEX DOCD: CPT | Performed by: INTERNAL MEDICINE

## 2024-12-19 PROCEDURE — 1036F TOBACCO NON-USER: CPT | Performed by: INTERNAL MEDICINE

## 2024-12-19 PROCEDURE — 3074F SYST BP LT 130 MM HG: CPT | Performed by: INTERNAL MEDICINE

## 2024-12-19 PROCEDURE — 1160F RVW MEDS BY RX/DR IN RCRD: CPT | Performed by: INTERNAL MEDICINE

## 2024-12-19 RX ORDER — LOSARTAN POTASSIUM AND HYDROCHLOROTHIAZIDE 12.5; 5 MG/1; MG/1
1 TABLET ORAL DAILY
Qty: 90 TABLET | Refills: 1 | Status: SHIPPED | OUTPATIENT
Start: 2024-12-19 | End: 2025-12-19

## 2024-12-19 RX ORDER — PANTOPRAZOLE SODIUM 40 MG/1
40 TABLET, DELAYED RELEASE ORAL DAILY
Qty: 90 TABLET | Refills: 3 | Status: SHIPPED | OUTPATIENT
Start: 2024-12-19

## 2024-12-19 ASSESSMENT — LIFESTYLE VARIABLES
HOW OFTEN DO YOU HAVE SIX OR MORE DRINKS ON ONE OCCASION: NEVER
SKIP TO QUESTIONS 9-10: 1
AUDIT TOTAL SCORE: 1
AUDIT-C TOTAL SCORE: 1
HOW OFTEN DO YOU HAVE A DRINK CONTAINING ALCOHOL: MONTHLY OR LESS
HAS A RELATIVE, FRIEND, DOCTOR, OR ANOTHER HEALTH PROFESSIONAL EXPRESSED CONCERN ABOUT YOUR DRINKING OR SUGGESTED YOU CUT DOWN: NO
HOW MANY STANDARD DRINKS CONTAINING ALCOHOL DO YOU HAVE ON A TYPICAL DAY: 1 OR 2
HAVE YOU OR SOMEONE ELSE BEEN INJURED AS A RESULT OF YOUR DRINKING: NO

## 2024-12-19 NOTE — PROGRESS NOTES
Assessment/Plan   69 years old male with a history of hypertension hyperlipidemia and has had sleep apnea is clinically doing well.  His medication changes were reviewed and discussed.  His medicines were refilled.    Patient's reflux disease is stable and his pantoprazole was refilled.    He has had history of gout and his uric acid level will be followed up.    Overall patient is doing well and if there is any change he will see me sooner otherwise he will be followed up in 3 to 4 months.      Problem List Items Addressed This Visit       Essential hypertension, benign    Relevant Medications    losartan-hydrochlorothiazide (Hyzaar) 50-12.5 mg tablet    Other Relevant Orders    CBC and Auto Differential    Gastroesophageal reflux disease    Relevant Medications    pantoprazole (ProtoNix) 40 mg EC tablet    Hyperuricemia    Hyperlipidemia, mixed - Primary    Relevant Orders    Comprehensive Metabolic Panel    Lipid Panel    Sleep apnea    Vitamin D deficiency    Relevant Orders    Vitamin D 25-Hydroxy,Total (for eval of Vitamin D levels)    Former smoker    Overweight (BMI 25.0-29.9)    Elevated blood sugar    Relevant Orders    Hemoglobin A1C       Subjective     Patient ID: Osman Alaniz is a 69 y.o. male who presents for Follow-up.    History of present illness  69 years old male with chronic medical illnesses came for a follow-up visit.  During previous visit he was investigated because of his episode of syncope while in the bar after having some drinks.  He had cardiac workup which included echocardiogram and Holter which did not show any significant abnormalities.    Since then he has been doing well.  He still drinks but he says he is limiting his alcohol.    He has had medication changes including his amlodipine was changed because of his leg swellings and he is tolerating the losartan hydrochlorothiazide well.  He has no further episodes of leg swelling or dizziness.    Rest of the review of systems no  acute complaints.    Family History   Problem Relation Name Age of Onset    Diabetes Mother      Pancreatic cancer Mother      Brain cancer Father        Social History     Socioeconomic History    Marital status: Single     Spouse name: Not on file    Number of children: Not on file    Years of education: Not on file    Highest education level: Not on file   Occupational History    Not on file   Tobacco Use    Smoking status: Former     Types: Cigarettes    Smokeless tobacco: Never   Vaping Use    Vaping status: Never Used   Substance and Sexual Activity    Alcohol use: Yes    Drug use: Never    Sexual activity: Not on file   Other Topics Concern    Not on file   Social History Narrative    Not on file     Social Drivers of Health     Financial Resource Strain: Not on file   Food Insecurity: No Food Insecurity (10/3/2024)    Hunger Vital Sign     Worried About Running Out of Food in the Last Year: Never true     Ran Out of Food in the Last Year: Never true   Transportation Needs: Not on file   Physical Activity: Not on file   Stress: Not on file   Social Connections: Not on file   Intimate Partner Violence: Not on file   Housing Stability: Not on file      Doxycycline and Penicillins   Current Outpatient Medications   Medication Sig Dispense Refill    allopurinol (Zyloprim) 100 mg tablet Take 2 tablets (200 mg) by mouth once daily. 180 tablet 1    atorvastatin (Lipitor) 40 mg tablet Take 1 tablet (40 mg) by mouth once daily at bedtime. 90 tablet 1    cholecalciferol (Vitamin D-3) 25 MCG (1000 UT) capsule Take 1 capsule (25 mcg) by mouth 2 times a day.      doxazosin (Cardura) 2 mg tablet Take 1 tablet (2 mg) by mouth 2 times a day. 180 tablet 1    latanoprost (Xalatan) 0.005 % ophthalmic solution Administer 1 drop into the right eye once daily at bedtime. 2.5 mL 6    polymyxin B sulf-trimethoprim (Polytrim) ophthalmic solution Administer 1 drop into the right eye once daily. 90 day supply 10 mL 2    prednisoLONE  acetate (Pred-Forte) 1 % ophthalmic suspension Administer 1 drop into the right eye once daily. 5 mL 3    sildenafil (Viagra) 50 mg tablet Take 1 tablet (50 mg) by mouth once daily as needed for erectile dysfunction. 12 tablet 3    losartan-hydrochlorothiazide (Hyzaar) 50-12.5 mg tablet Take 1 tablet by mouth once daily. 90 tablet 1    pantoprazole (ProtoNix) 40 mg EC tablet Take 1 tablet (40 mg) by mouth once daily. 90 tablet 3     No current facility-administered medications for this visit.        Objective     Vitals:    12/19/24 1038   BP: 127/79   Pulse: 79        Physical Exam    Moderate obese, well-nourished with no apparent distress. Alert oriented,  Skin: Normal turgor.   Head: Normocephalic, atraumatic.  Eyes: No acute changes, both eyes and cornea has no irritation. No conjunctivitis  No pallor of conjunctivae. Mucous membranes are moist  Neck: Supple. No JVD. No carotid bruit. No thyromegaly. No cervical lymphadenopathy.  No clubbing. Peripheral osteoarthritis noted.  Chest: Vesicular breathing Bilaterally good air entry. No wheezing. No crackles.  Heart: Regular rate and rhythm. S1, S2 positive. No murmur.  Abdomen: Soft and nontender. Bowel sounds are positive. No organomegaly.  Extremities: Bilaterally has leg no leg swelling.  Bilaterally 2+ dorsalis pedis pulses.  No calf tenderness. Homans sign is negative.  Neuro Exam: No focal signs. Gait is normal.     Problem List Items Addressed This Visit       Essential hypertension, benign    Relevant Medications    losartan-hydrochlorothiazide (Hyzaar) 50-12.5 mg tablet    Other Relevant Orders    CBC and Auto Differential    Gastroesophageal reflux disease    Relevant Medications    pantoprazole (ProtoNix) 40 mg EC tablet    Hyperuricemia    Hyperlipidemia, mixed - Primary    Relevant Orders    Comprehensive Metabolic Panel    Lipid Panel    Sleep apnea    Vitamin D deficiency    Relevant Orders    Vitamin D 25-Hydroxy,Total (for eval of Vitamin D  levels)    Former smoker    Overweight (BMI 25.0-29.9)    Elevated blood sugar    Relevant Orders    Hemoglobin A1C        Orders Placed This Encounter   Procedures    CBC and Auto Differential     Standing Status:   Future     Standing Expiration Date:   12/19/2025     Order Specific Question:   Release result to [employer]     Answer:   Immediate    Comprehensive Metabolic Panel     Standing Status:   Future     Standing Expiration Date:   4/19/2025     Order Specific Question:   [facility]     Answer:   Immediate    Lipid Panel     fasting     Standing Status:   Future     Standing Expiration Date:   4/19/2025     Order Specific Question:   Release result to [employer] [facility]     Answer:   Immediate [1]    Vitamin D 25-Hydroxy,Total (for eval of Vitamin D levels)     Standing Status:   Future     Standing Expiration Date:   12/19/2025     Order Specific Question:   Release result to [employer] [facility]     Answer:   Immediate    Hemoglobin A1C     Standing Status:   Future     Standing Expiration Date:   4/19/2025     Order Specific Question:   Release result to [employer]     Answer:   Immediate        Lab Results   Component Value Date    WBC 6.1 12/12/2024    HGB 14.6 12/12/2024    HCT 44.7 12/12/2024     12/12/2024    CHOL 169 10/10/2024    TRIG 172 (H) 10/10/2024    HDL 50.6 10/10/2024    ALT 34 12/12/2024    AST 20 12/12/2024     12/12/2024    K 4.7 12/12/2024     12/12/2024    CREATININE 1.17 12/12/2024    BUN 18 12/12/2024    CO2 31 12/12/2024    TSH 1.87 10/10/2024     Lab Results   Component Value Date    CHOL 169 10/10/2024    LDLCALC 84 10/10/2024    CHHDL 3.3 10/10/2024       11/1/2024  CONCLUSIONS:  1. Left ventricular ejection fraction is normal, calculated by Diehl's biplane at 65%.  2. There is normal right ventricular global systolic function.  3. There is No tricuspid stenosis.  4. Aortic valve stenosis is not present.

## 2025-01-10 ENCOUNTER — APPOINTMENT (OUTPATIENT)
Dept: OPHTHALMOLOGY | Facility: CLINIC | Age: 70
End: 2025-01-10
Payer: MEDICARE

## 2025-01-10 DIAGNOSIS — T86.8409 CORNEAL GRAFT REJECTION: ICD-10-CM

## 2025-01-10 DIAGNOSIS — Z94.7 CORNEAL TRANSPLANT STATUS: Primary | ICD-10-CM

## 2025-01-10 PROCEDURE — 99212 OFFICE O/P EST SF 10 MIN: CPT | Performed by: OPTOMETRIST

## 2025-01-10 ASSESSMENT — REFRACTION_CURRENTRX
OD_SPHERE: -3.00
OD_DIAMETER: 16.0
OD_BASECURVE: 9.8
OD_CYLINDER: SPHERE

## 2025-01-10 ASSESSMENT — SLIT LAMP EXAM - LIDS
COMMENTS: NORMAL
COMMENTS: NORMAL

## 2025-01-10 ASSESSMENT — VISUAL ACUITY
METHOD: SNELLEN - LINEAR
OD_CC: 20/200-
OS_SC: 20/20
CORRECTION_TYPE: CONTACTS

## 2025-01-10 ASSESSMENT — ENCOUNTER SYMPTOMS
RESPIRATORY NEGATIVE: 0
NEUROLOGICAL NEGATIVE: 0
CARDIOVASCULAR NEGATIVE: 0
ALLERGIC/IMMUNOLOGIC NEGATIVE: 0
PSYCHIATRIC NEGATIVE: 0
MUSCULOSKELETAL NEGATIVE: 0
EYES NEGATIVE: 1
ENDOCRINE NEGATIVE: 0
GASTROINTESTINAL NEGATIVE: 0
CONSTITUTIONAL NEGATIVE: 0
HEMATOLOGIC/LYMPHATIC NEGATIVE: 0

## 2025-01-10 ASSESSMENT — EXTERNAL EXAM - LEFT EYE: OS_EXAM: NORMAL

## 2025-01-10 ASSESSMENT — TONOMETRY: IOP_METHOD: GOLDMANN APPLANATION

## 2025-01-10 ASSESSMENT — EXTERNAL EXAM - RIGHT EYE: OD_EXAM: NORMAL

## 2025-01-10 NOTE — PROGRESS NOTES
Assessment/Plan   Diagnoses and all orders for this visit:  Corneal transplant status  Corneal graft rejection  Replaced BSCL Right eye with a new Kontur CL which patient wears 24/7  Have 4 more unopened lenses at .    Follow up 3 months to change BSCL Right eye     Continue appointments with [facility] as scheduled

## 2025-01-28 ENCOUNTER — APPOINTMENT (OUTPATIENT)
Dept: OPHTHALMOLOGY | Facility: CLINIC | Age: 70
End: 2025-01-28
Payer: MEDICARE

## 2025-01-28 DIAGNOSIS — Z94.7 CORNEAL TRANSPLANT STATUS: ICD-10-CM

## 2025-01-28 DIAGNOSIS — H40.1112 PRIMARY OPEN ANGLE GLAUCOMA (POAG) OF RIGHT EYE, MODERATE STAGE: Primary | ICD-10-CM

## 2025-01-28 PROCEDURE — 99214 OFFICE O/P EST MOD 30 MIN: CPT | Performed by: OPHTHALMOLOGY

## 2025-01-28 PROCEDURE — 92083 EXTENDED VISUAL FIELD XM: CPT | Performed by: OPHTHALMOLOGY

## 2025-01-28 RX ORDER — POLYMYXIN B SULFATE AND TRIMETHOPRIM 1; 10000 MG/ML; [USP'U]/ML
1 SOLUTION OPHTHALMIC DAILY
Qty: 10 ML | Refills: 3 | Status: SHIPPED | OUTPATIENT
Start: 2025-01-28 | End: 2025-04-28

## 2025-01-28 RX ORDER — PREDNISOLONE ACETATE 10 MG/ML
1 SUSPENSION/ DROPS OPHTHALMIC DAILY
Qty: 5 ML | Refills: 3 | Status: SHIPPED | OUTPATIENT
Start: 2025-01-28 | End: 2025-04-28

## 2025-01-28 RX ORDER — LATANOPROST 50 UG/ML
1 SOLUTION/ DROPS OPHTHALMIC NIGHTLY
Qty: 2.5 ML | Refills: 3 | Status: SHIPPED | OUTPATIENT
Start: 2025-01-28 | End: 2025-04-28

## 2025-01-28 ASSESSMENT — TONOMETRY
OS_IOP_MMHG: 13
IOP_METHOD: GOLDMANN APPLANATION

## 2025-01-28 ASSESSMENT — EXTERNAL EXAM - LEFT EYE: OS_EXAM: NORMAL

## 2025-01-28 ASSESSMENT — VISUAL ACUITY
OS_SC: 20/20
OS_SC+: -1
METHOD: SNELLEN - LINEAR
OD_SC: 20/400

## 2025-01-28 ASSESSMENT — EXTERNAL EXAM - RIGHT EYE: OD_EXAM: NORMAL

## 2025-01-28 ASSESSMENT — ENCOUNTER SYMPTOMS: EYES NEGATIVE: 1

## 2025-01-28 ASSESSMENT — GONIOSCOPY: OS_SUPERIOR: 3/360

## 2025-01-28 ASSESSMENT — SLIT LAMP EXAM - LIDS
COMMENTS: NORMAL
COMMENTS: NORMAL

## 2025-02-04 ENCOUNTER — TELEPHONE (OUTPATIENT)
Dept: PRIMARY CARE | Facility: CLINIC | Age: 70
End: 2025-02-04
Payer: MEDICARE

## 2025-02-13 ENCOUNTER — APPOINTMENT (OUTPATIENT)
Dept: PRIMARY CARE | Facility: CLINIC | Age: 70
End: 2025-02-13
Payer: MEDICARE

## 2025-03-06 ENCOUNTER — APPOINTMENT (OUTPATIENT)
Dept: OPHTHALMOLOGY | Facility: CLINIC | Age: 70
End: 2025-03-06
Payer: MEDICARE

## 2025-03-11 DIAGNOSIS — E79.0 HYPERURICEMIA: ICD-10-CM

## 2025-03-11 DIAGNOSIS — E78.2 HYPERLIPIDEMIA, MIXED: ICD-10-CM

## 2025-03-11 RX ORDER — ALLOPURINOL 100 MG/1
200 TABLET ORAL DAILY
Qty: 180 TABLET | Refills: 1 | Status: SHIPPED | OUTPATIENT
Start: 2025-03-11

## 2025-03-11 RX ORDER — ATORVASTATIN CALCIUM 40 MG/1
40 TABLET, FILM COATED ORAL NIGHTLY
Qty: 90 TABLET | Refills: 1 | Status: SHIPPED | OUTPATIENT
Start: 2025-03-11

## 2025-03-13 ENCOUNTER — APPOINTMENT (OUTPATIENT)
Dept: PRIMARY CARE | Facility: CLINIC | Age: 70
End: 2025-03-13
Payer: MEDICARE

## 2025-03-20 DIAGNOSIS — K21.9 GASTROESOPHAGEAL REFLUX DISEASE WITHOUT ESOPHAGITIS: ICD-10-CM

## 2025-03-20 RX ORDER — PANTOPRAZOLE SODIUM 40 MG/1
40 TABLET, DELAYED RELEASE ORAL DAILY
Qty: 90 TABLET | Refills: 0 | Status: SHIPPED | OUTPATIENT
Start: 2025-03-20

## 2025-04-01 ENCOUNTER — TELEPHONE (OUTPATIENT)
Dept: PRIMARY CARE | Facility: CLINIC | Age: 70
End: 2025-04-01
Payer: MEDICARE

## 2025-04-01 LAB
25(OH)D3+25(OH)D2 SERPL-MCNC: 40 NG/ML (ref 30–100)
ALBUMIN SERPL-MCNC: 4.4 G/DL (ref 3.6–5.1)
ALP SERPL-CCNC: 66 U/L (ref 35–144)
ALT SERPL-CCNC: 31 U/L (ref 9–46)
ANION GAP SERPL CALCULATED.4IONS-SCNC: 9 MMOL/L (CALC) (ref 7–17)
AST SERPL-CCNC: 19 U/L (ref 10–35)
BASOPHILS # BLD AUTO: 42 CELLS/UL (ref 0–200)
BASOPHILS NFR BLD AUTO: 0.7 %
BILIRUB SERPL-MCNC: 0.5 MG/DL (ref 0.2–1.2)
BUN SERPL-MCNC: 18 MG/DL (ref 7–25)
CALCIUM SERPL-MCNC: 9.6 MG/DL (ref 8.6–10.3)
CHLORIDE SERPL-SCNC: 105 MMOL/L (ref 98–110)
CHOLEST SERPL-MCNC: 187 MG/DL
CHOLEST/HDLC SERPL: 3.4 (CALC)
CO2 SERPL-SCNC: 29 MMOL/L (ref 20–32)
CREAT SERPL-MCNC: 1.28 MG/DL (ref 0.7–1.35)
EGFRCR SERPLBLD CKD-EPI 2021: 61 ML/MIN/1.73M2
EOSINOPHIL # BLD AUTO: 240 CELLS/UL (ref 15–500)
EOSINOPHIL NFR BLD AUTO: 4 %
ERYTHROCYTE [DISTWIDTH] IN BLOOD BY AUTOMATED COUNT: 14.3 % (ref 11–15)
EST. AVERAGE GLUCOSE BLD GHB EST-MCNC: 123 MG/DL
EST. AVERAGE GLUCOSE BLD GHB EST-SCNC: 6.8 MMOL/L
GLUCOSE SERPL-MCNC: 101 MG/DL (ref 65–99)
HBA1C MFR BLD: 5.9 % OF TOTAL HGB
HCT VFR BLD AUTO: 46 % (ref 38.5–50)
HDLC SERPL-MCNC: 55 MG/DL
HGB BLD-MCNC: 15.2 G/DL (ref 13.2–17.1)
LDLC SERPL CALC-MCNC: 92 MG/DL (CALC)
LYMPHOCYTES # BLD AUTO: 2016 CELLS/UL (ref 850–3900)
LYMPHOCYTES NFR BLD AUTO: 33.6 %
MCH RBC QN AUTO: 28.8 PG (ref 27–33)
MCHC RBC AUTO-ENTMCNC: 33 G/DL (ref 32–36)
MCV RBC AUTO: 87.3 FL (ref 80–100)
MONOCYTES # BLD AUTO: 534 CELLS/UL (ref 200–950)
MONOCYTES NFR BLD AUTO: 8.9 %
NEUTROPHILS # BLD AUTO: 3168 CELLS/UL (ref 1500–7800)
NEUTROPHILS NFR BLD AUTO: 52.8 %
NONHDLC SERPL-MCNC: 132 MG/DL (CALC)
PLATELET # BLD AUTO: 189 THOUSAND/UL (ref 140–400)
PMV BLD REES-ECKER: 10.9 FL (ref 7.5–12.5)
POTASSIUM SERPL-SCNC: 4.9 MMOL/L (ref 3.5–5.3)
PROT SERPL-MCNC: 7.6 G/DL (ref 6.1–8.1)
RBC # BLD AUTO: 5.27 MILLION/UL (ref 4.2–5.8)
SODIUM SERPL-SCNC: 143 MMOL/L (ref 135–146)
TRIGL SERPL-MCNC: 277 MG/DL
WBC # BLD AUTO: 6 THOUSAND/UL (ref 3.8–10.8)

## 2025-04-01 NOTE — RESULT ENCOUNTER NOTE
Results are acceptable except slightly elevated triglycerides and blood sugar reading.  We will review the results in detail during office follow-up and please advise patient to keep the follow-up appointment as scheduled.

## 2025-04-01 NOTE — TELEPHONE ENCOUNTER
----- Message from Marquis Stein sent at 4/1/2025  3:23 PM EDT -----  Results are acceptable except slightly elevated triglycerides and blood sugar reading.  We will review the results in detail during office follow-up and please advise patient to keep the follow-up appointment as scheduled.

## 2025-04-03 ENCOUNTER — APPOINTMENT (OUTPATIENT)
Dept: OPHTHALMOLOGY | Facility: CLINIC | Age: 70
End: 2025-04-03
Payer: MEDICARE

## 2025-04-03 DIAGNOSIS — S05.31XS RUPTURED GLOBE, RIGHT, SEQUELA: Primary | ICD-10-CM

## 2025-04-03 DIAGNOSIS — H40.1112 PRIMARY OPEN ANGLE GLAUCOMA (POAG) OF RIGHT EYE, MODERATE STAGE: ICD-10-CM

## 2025-04-03 DIAGNOSIS — Z94.7 CORNEAL TRANSPLANT STATUS: ICD-10-CM

## 2025-04-03 DIAGNOSIS — H18.20 CORNEAL EDEMA: ICD-10-CM

## 2025-04-03 DIAGNOSIS — H25.812 COMBINED FORMS OF AGE-RELATED CATARACT OF LEFT EYE: ICD-10-CM

## 2025-04-03 PROCEDURE — G2211 COMPLEX E/M VISIT ADD ON: HCPCS | Performed by: OPHTHALMOLOGY

## 2025-04-03 PROCEDURE — 99214 OFFICE O/P EST MOD 30 MIN: CPT | Performed by: OPHTHALMOLOGY

## 2025-04-03 ASSESSMENT — VISUAL ACUITY
OD_SC: 20/400
OS_SC: 20/20
OD_SC+: +1
METHOD: SNELLEN - LINEAR
OS_SC+: -1

## 2025-04-03 ASSESSMENT — SLIT LAMP EXAM - LIDS
COMMENTS: NORMAL
COMMENTS: NORMAL

## 2025-04-03 ASSESSMENT — ENCOUNTER SYMPTOMS: EYES NEGATIVE: 1

## 2025-04-03 ASSESSMENT — CUP TO DISC RATIO: OD_RATIO: 0.65

## 2025-04-03 ASSESSMENT — TONOMETRY
IOP_METHOD: TONOPEN
OS_IOP_MMHG: 14

## 2025-04-03 ASSESSMENT — EXTERNAL EXAM - LEFT EYE: OS_EXAM: NORMAL

## 2025-04-03 ASSESSMENT — EXTERNAL EXAM - RIGHT EYE: OD_EXAM: NORMAL

## 2025-04-03 NOTE — PROGRESS NOTES
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 No complaints. Good CL fit.    - retroprosthetic membrane (subtle)   - cont polytrim OD QD  - cont prednisolone OD QD  - follow u with Dr. 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 Bradshaw      Pseudophakia (PCIOL) OD / Early Cataract OS:  minimally visually significant   - Observe      6 months

## 2025-04-10 ENCOUNTER — APPOINTMENT (OUTPATIENT)
Dept: PRIMARY CARE | Facility: CLINIC | Age: 70
End: 2025-04-10
Payer: MEDICARE

## 2025-04-10 VITALS
SYSTOLIC BLOOD PRESSURE: 123 MMHG | HEIGHT: 72 IN | DIASTOLIC BLOOD PRESSURE: 69 MMHG | BODY MASS INDEX: 28.58 KG/M2 | WEIGHT: 211 LBS | HEART RATE: 83 BPM

## 2025-04-10 DIAGNOSIS — E79.0 HYPERURICEMIA: ICD-10-CM

## 2025-04-10 DIAGNOSIS — G47.30 SLEEP APNEA, UNSPECIFIED TYPE: ICD-10-CM

## 2025-04-10 DIAGNOSIS — E55.9 VITAMIN D DEFICIENCY: ICD-10-CM

## 2025-04-10 DIAGNOSIS — R06.09 DYSPNEA ON EXERTION: ICD-10-CM

## 2025-04-10 DIAGNOSIS — E66.3 OVERWEIGHT (BMI 25.0-29.9): ICD-10-CM

## 2025-04-10 DIAGNOSIS — Z86.19 HISTORY OF HERPES ZOSTER: ICD-10-CM

## 2025-04-10 DIAGNOSIS — F10.90 ALCOHOL USE: ICD-10-CM

## 2025-04-10 DIAGNOSIS — R53.83 TIREDNESS: ICD-10-CM

## 2025-04-10 DIAGNOSIS — E78.2 HYPERLIPIDEMIA, MIXED: Primary | ICD-10-CM

## 2025-04-10 DIAGNOSIS — R73.9 ELEVATED BLOOD SUGAR: ICD-10-CM

## 2025-04-10 DIAGNOSIS — K21.9 GASTROESOPHAGEAL REFLUX DISEASE WITHOUT ESOPHAGITIS: ICD-10-CM

## 2025-04-10 DIAGNOSIS — Z94.7 CORNEAL TRANSPLANT STATUS: ICD-10-CM

## 2025-04-10 DIAGNOSIS — I10 ESSENTIAL HYPERTENSION, BENIGN: ICD-10-CM

## 2025-04-10 ASSESSMENT — PATIENT HEALTH QUESTIONNAIRE - PHQ9
1. LITTLE INTEREST OR PLEASURE IN DOING THINGS: NOT AT ALL
SUM OF ALL RESPONSES TO PHQ9 QUESTIONS 1 AND 2: 0
2. FEELING DOWN, DEPRESSED OR HOPELESS: NOT AT ALL

## 2025-04-10 ASSESSMENT — LIFESTYLE VARIABLES
HAS A RELATIVE, FRIEND, DOCTOR, OR ANOTHER HEALTH PROFESSIONAL EXPRESSED CONCERN ABOUT YOUR DRINKING OR SUGGESTED YOU CUT DOWN: NO
HOW OFTEN DO YOU HAVE A DRINK CONTAINING ALCOHOL: 2-3 TIMES A WEEK
HOW MANY STANDARD DRINKS CONTAINING ALCOHOL DO YOU HAVE ON A TYPICAL DAY: 1 OR 2
SKIP TO QUESTIONS 9-10: 0
HAVE YOU OR SOMEONE ELSE BEEN INJURED AS A RESULT OF YOUR DRINKING: NO
AUDIT TOTAL SCORE: -1
HOW OFTEN DO YOU HAVE SIX OR MORE DRINKS ON ONE OCCASION: WEEKLY
AUDIT-C TOTAL SCORE: 6

## 2025-04-10 NOTE — PROGRESS NOTES
Assessment & Plan  Sleep Apnea  Explained apneic cycle effects on oxygen levels and sleep quality. Discussed options including newer CPAP devices, surgery, or consultation with a sleep specialist.  - Refer to sleep specialist for evaluation and management.  - Consider split sleep study for CPAP fitting.  Discussed and reviewed the importance of lifestyle modification and weight loss to improve the sleep apnea.    Cardiac Evaluation  Previous cardiac evaluations normal. Plans to conduct a stress test to assess cardiac function further. Explained potential need for a nuclear stress test if regular stress test is inconclusive.  - Order EKG to assess baseline cardiac function.  - Regular stress test is ordered after reviewing the EKG today.  EKG has no acute ST-T changes.  - Consider nuclear stress test if EKG shows abnormalities.    Erectile Dysfunction  Advised waiting for cardiac evaluation results before adjusting medication due to potential interactions.  - Reassess treatment after cardiac evaluation results.    Pre-Diabetes  Hemoglobin A1c is 5.9, indicating pre-diabetes. Advised monitoring blood glucose levels and maintaining a diet to manage fructose intolerance.  - Monitor blood glucose levels.  - Advise maintaining a diet suitable for fructose intolerance.    Alcohol Use  Discussed potential health risks of alcohol consumption, even at social levels. Advised reducing intake.  - Advise reducing alcohol intake to no more than two beers per sitting and twice a week.    Exercise and Physical Activity  Emphasized importance of regular cardiovascular and strength training exercises to maintain muscle mass and metabolic rate, especially with aging.  - Encourage starting a walking routine and incorporating small weights for strength training.       Assessment/Plan     Problem List Items Addressed This Visit       Essential hypertension, benign    Gastroesophageal reflux disease    Hyperuricemia    Hyperlipidemia,  mixed - Primary    Sleep apnea    Relevant Orders    Referral to Adult Sleep Medicine    Vitamin D deficiency    History of herpes zoster    Overweight (BMI 25.0-29.9)    Corneal transplant status    Elevated blood sugar    Alcohol use    Tiredness    Relevant Orders    ECG 12 Lead    Stress Test    Dyspnea on exertion    Relevant Orders    ECG 12 Lead    Stress Test       Subjective 0    Patient ID: Osman Alaniz is a 69 y.o. male who presents for Hypertension (Follow up).    History of Present Illness  Osman Alaniz is a 69 year old male with chronic medical problems including hypertension hyperlipidemia and also has history of sleep apnea who presents with fatigue and sleep disturbances.    He experiences persistent fatigue despite sleeping for twelve hours. His sleep pattern has been disrupted due to a change in his work schedule, now starting at 11 AM instead of 5 AM. He often lies awake in bed and lacks motivation for daily tasks. He goes to bed around 8 PM after watching TV and wakes up several times during the night, a pattern he has had lifelong. He does not use a CPAP machine despite a sleep study 20 years ago indicating its necessity.    No chest pain or shortness of breath during normal activities, but he feels tired after climbing stairs to the third floor. He estimates he can walk about a mile without issues, based on his work routine. Previous cardiac evaluations, including EKG, echocardiogram, and Holter monitor, were normal.    He consumes alcohol about twice a week, typically having three beers per session, averaging one beer per day. He sometimes drinks more or less depending on the occasion.  He has had his remote history of syncope about a year ago after having more than 5 or 6 Beer..  He did not go to the ER and workup as outpatient including echocardiogram and Holter monitor were negative in 2024.    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 He  intends to start walking more now that he has mornings free but has not engaged in regular exercise or gym activities in the past.    He is mindful of his diet due to fructose intolerance, although he occasionally indulges, such as drinking Pepsi during a recent trip. Recent blood tests showed a hemoglobin A1c of 5.9 and a fasting blood sugar of 101, slightly above normal levels.    He had seen the ophthalmologist and the records are reviewed.    His medications are reviewed and his blood pressure is well-controlled.       Past Surgical History:   Procedure Laterality Date    OTHER SURGICAL HISTORY  10/02/2014    Cornea Transplant    OTHER SURGICAL HISTORY  08/30/2022    Complete colonoscopy    OTHER SURGICAL HISTORY  10/26/2021    Dental implant procedure    OTHER SURGICAL HISTORY  10/26/2021    Oral surgery        ROS    Family History   Problem Relation Name Age of Onset    Diabetes Mother      Pancreatic cancer Mother      Brain cancer Father        Social History     Socioeconomic History    Marital status: Single     Spouse name: Not on file    Number of children: Not on file    Years of education: Not on file    Highest education level: Not on file   Occupational History    Not on file   Tobacco Use    Smoking status: Former     Types: Cigarettes    Smokeless tobacco: Never   Vaping Use    Vaping status: Never Used   Substance and Sexual Activity    Alcohol use: Yes    Drug use: Never    Sexual activity: Not on file   Other Topics Concern    Not on file   Social History Narrative    Not on file     Social Drivers of Health     Financial Resource Strain: Not on file   Food Insecurity: No Food Insecurity (10/3/2024)    Hunger Vital Sign     Worried About Running Out of Food in the Last Year: Never true     Ran Out of Food in the Last Year: Never true   Transportation Needs: Not on file   Physical Activity: Not on file   Stress: Not on file   Social Connections: Not on file   Intimate Partner Violence: Not on  file   Housing Stability: Not on file      Doxycycline and Penicillins   Current Outpatient Medications   Medication Sig Dispense Refill    allopurinol (Zyloprim) 100 mg tablet TAKE TWO TABLETS BY MOUTH DAILY 180 tablet 1    atorvastatin (Lipitor) 40 mg tablet TAKE ONE TABLET BY MOUTH DAILY AT BEDTIME 90 tablet 1    cholecalciferol (Vitamin D-3) 25 MCG (1000 UT) capsule Take 1 capsule (25 mcg) by mouth 2 times a day.      doxazosin (Cardura) 2 mg tablet Take 1 tablet (2 mg) by mouth 2 times a day. 180 tablet 1    latanoprost (Xalatan) 0.005 % ophthalmic solution Administer 1 drop into the left eye once daily at bedtime. (Patient taking differently: Administer 1 drop into the right eye once daily at bedtime.) 2.5 mL 3    losartan-hydrochlorothiazide (Hyzaar) 50-12.5 mg tablet Take 1 tablet by mouth once daily. 90 tablet 1    pantoprazole (ProtoNix) 40 mg EC tablet TAKE ONE TABLET BY MOUTH DAILY 90 tablet 0    polymyxin B sulf-trimethoprim (Polytrim) ophthalmic solution Administer 1 drop into the right eye once daily. 90 day supply 10 mL 3    prednisoLONE acetate (Pred-Forte) 1 % ophthalmic suspension Administer 1 drop into the right eye once daily. 5 mL 3    sildenafil (Viagra) 50 mg tablet Take 1 tablet (50 mg) by mouth once daily as needed for erectile dysfunction. 12 tablet 3     No current facility-administered medications for this visit.        Objective     Vitals:    04/10/25 0816   BP: 123/69   Pulse: 83        Physical Exam  Moderate obese, well-nourished with no apparent distress. Alert oriented,  Skin: Normal turgor.   Head: Normocephalic, atraumatic.  Eyes: No acute changes, both eyes and cornea has no irritation. No conjunctivitis  No pallor of conjunctivae. Mucous membranes are moist  Neck: Supple. No JVD. No carotid bruit. No thyromegaly. No cervical lymphadenopathy.  No clubbing. Peripheral osteoarthritis noted.  Chest: Vesicular breathing Bilaterally good air entry. No wheezing. No crackles.  Heart:  Regular rate and rhythm. S1, S2 positive. No murmur.  Abdomen: Soft and nontender. Bowel sounds are positive. No organomegaly.  Extremities: Bilaterally has leg no leg swelling.  Bilaterally 2+ dorsalis pedis pulses.  No calf tenderness. Homans sign is negative.  Neuro Exam: No focal signs. Gait is normal.         Problem List Items Addressed This Visit       Essential hypertension, benign    Gastroesophageal reflux disease    Hyperuricemia    Hyperlipidemia, mixed - Primary    Sleep apnea    Relevant Orders    Referral to Adult Sleep Medicine    Vitamin D deficiency    History of herpes zoster    Overweight (BMI 25.0-29.9)    Corneal transplant status    Elevated blood sugar    Alcohol use    Tiredness    Relevant Orders    ECG 12 Lead    Stress Test    Dyspnea on exertion    Relevant Orders    ECG 12 Lead    Stress Test        Orders Placed This Encounter   Procedures    Referral to Adult Sleep Medicine     Standing Status:   Future     Standing Expiration Date:   4/10/2026     Referral Priority:   Routine     Referral Type:   Consultation     Referral Reason:   Specialty Services Required     Number of Visits Requested:   1    Stress Test     Standing Status:   Future     Standing Expiration Date:   4/10/2026     Order Specific Question:   Release result to [employer]     Answer:   Immediate [1]     Order Specific Question:   Is this procedure part of a Research Study? If Yes, link this order to the research study     Answer:   No    ECG 12 Lead     Order Specific Question:   Reason for Exam:     Answer:   dyspnea        @LASTLAB[<insert lab base name>:<insert number of results or*for all>,<repeat format for multiple labs>]@  Lab Results   Component Value Date    CHOL 187 03/31/2025    LDLCALC 92 03/31/2025    CHHDL 3.4 03/31/2025                   This medical note was created with the assistance of artificial intelligence (AI) for documentation purposes. The content has been reviewed and confirmed by the  healthcare provider for accuracy and completeness. Patient consented to the use of audio recording and use of AI during their visit.

## 2025-04-18 ENCOUNTER — APPOINTMENT (OUTPATIENT)
Dept: OPHTHALMOLOGY | Facility: CLINIC | Age: 70
End: 2025-04-18
Payer: MEDICARE

## 2025-04-18 DIAGNOSIS — H02.88A MEIBOMIAN GLAND DYSFUNCTION (MGD) OF UPPER AND LOWER LIDS OF BOTH EYES: ICD-10-CM

## 2025-04-18 DIAGNOSIS — H02.88B MEIBOMIAN GLAND DYSFUNCTION (MGD) OF UPPER AND LOWER LIDS OF BOTH EYES: ICD-10-CM

## 2025-04-18 DIAGNOSIS — T86.8409 CORNEAL GRAFT REJECTION: ICD-10-CM

## 2025-04-18 DIAGNOSIS — Z94.7 CORNEAL TRANSPLANT STATUS: Primary | ICD-10-CM

## 2025-04-18 PROCEDURE — 99213 OFFICE O/P EST LOW 20 MIN: CPT | Performed by: OPTOMETRIST

## 2025-04-18 RX ORDER — MOXIFLOXACIN 5 MG/ML
1 SOLUTION/ DROPS OPHTHALMIC 2 TIMES DAILY
Qty: 10 ML | Refills: 2 | Status: SHIPPED | OUTPATIENT
Start: 2025-04-18

## 2025-04-18 ASSESSMENT — ENCOUNTER SYMPTOMS
HEMATOLOGIC/LYMPHATIC NEGATIVE: 0
ENDOCRINE NEGATIVE: 0
CARDIOVASCULAR NEGATIVE: 0
GASTROINTESTINAL NEGATIVE: 0
PSYCHIATRIC NEGATIVE: 0
NEUROLOGICAL NEGATIVE: 0
ALLERGIC/IMMUNOLOGIC NEGATIVE: 0
MUSCULOSKELETAL NEGATIVE: 0
EYES NEGATIVE: 1
RESPIRATORY NEGATIVE: 0
CONSTITUTIONAL NEGATIVE: 0

## 2025-04-18 ASSESSMENT — REFRACTION_CURRENTRX
OD_BASECURVE: 9.8
OD_CYLINDER: SPHERE
OD_DIAMETER: 16.0
OD_SPHERE: -3.00

## 2025-04-18 ASSESSMENT — VISUAL ACUITY
OD_CC+: +2
METHOD: SNELLEN - LINEAR
OD_PH_CC+: -1
OD_CC: 20/400
OD_PH_CC: 20/200
OS_SC: 20/20

## 2025-04-18 ASSESSMENT — TONOMETRY: OS_IOP_MMHG: DEF

## 2025-04-18 ASSESSMENT — EXTERNAL EXAM - RIGHT EYE: OD_EXAM: NORMAL

## 2025-04-18 ASSESSMENT — EXTERNAL EXAM - LEFT EYE: OS_EXAM: NORMAL

## 2025-04-18 NOTE — PROGRESS NOTES
Assessment/Plan   Diagnoses and all orders for this visit:  Corneal transplant status  Corneal graft rejection  Meibomian gland dysfunction (MGD) of upper and lower lids of both eyes    Successful BSCL exchange OD of Tatyana CL, which patient wears 24/7. 3 more unopened lenses at [facility].     Mucus discharge and diffuse redness noted today OD in office. Patient notes OD has been producing more matter in AM over the past month. Notes good compliance with Polytrim gtts qday OD, which patient has been on for many years. Discussed option of switching OD abx due to clinical presentation today and to reduce risk of infection 2' antibiotic resistance. Patient agreeable - Rx sent for moxifloxacin gtts qday OD.    Mild signs of MGD noted on examination today. Recommend lid cleansing qhs OU, and PF AFTs up to QID OU. Provided patient with samples of lid scrubs and PF AFTs and instructions on their use. Discussed that this is a chronic problem which requires chronic/consistent treatment.     Monitor in one month with anterior segment check OD or sooner if problems arise.    [address] - [facility]i

## 2025-04-18 NOTE — Clinical Note
I switched his antibiotic to moxi today since he had unusually higher mucous discharge Right eye - hope that is OK with you - I also changed his BSCL and will recheck in 1 month

## 2025-04-24 ENCOUNTER — HOSPITAL ENCOUNTER (OUTPATIENT)
Dept: CARDIOLOGY | Facility: CLINIC | Age: 70
Discharge: HOME | End: 2025-04-24
Payer: MEDICARE

## 2025-04-24 DIAGNOSIS — R06.09 DYSPNEA ON EXERTION: ICD-10-CM

## 2025-04-24 DIAGNOSIS — R53.83 TIREDNESS: ICD-10-CM

## 2025-04-24 PROCEDURE — 93018 CV STRESS TEST I&R ONLY: CPT | Performed by: INTERNAL MEDICINE

## 2025-04-24 PROCEDURE — 93017 CV STRESS TEST TRACING ONLY: CPT

## 2025-04-24 PROCEDURE — 93016 CV STRESS TEST SUPVJ ONLY: CPT | Performed by: INTERNAL MEDICINE

## 2025-04-24 NOTE — RESULT ENCOUNTER NOTE
Regular stress test results are acceptable.  We will review the results in detail during office follow-up and please advise patient to keep the follow-up appointment as scheduled.

## 2025-04-25 ENCOUNTER — TELEPHONE (OUTPATIENT)
Dept: PRIMARY CARE | Facility: CLINIC | Age: 70
End: 2025-04-25
Payer: MEDICARE

## 2025-04-25 NOTE — TELEPHONE ENCOUNTER
----- Message from Marquis Stein sent at 4/24/2025  3:52 PM EDT -----  Regular stress test results are acceptable.  We will review the results in detail during office follow-up and please advise patient to keep the follow-up appointment as scheduled.  ----- Message -----  From: Interface, [facility] - Cardiology Results In  Sent: 4/24/2025   3:50 PM EDT  To: Marquis Stein MD

## 2025-05-08 ENCOUNTER — APPOINTMENT (OUTPATIENT)
Dept: PRIMARY CARE | Facility: CLINIC | Age: 70
End: 2025-05-08
Payer: MEDICARE

## 2025-05-08 VITALS
HEIGHT: 72 IN | BODY MASS INDEX: 27.09 KG/M2 | WEIGHT: 200 LBS | DIASTOLIC BLOOD PRESSURE: 56 MMHG | HEART RATE: 80 BPM | SYSTOLIC BLOOD PRESSURE: 112 MMHG

## 2025-05-08 DIAGNOSIS — I10 ESSENTIAL HYPERTENSION, BENIGN: ICD-10-CM

## 2025-05-08 DIAGNOSIS — F10.90 ALCOHOL USE: ICD-10-CM

## 2025-05-08 DIAGNOSIS — G47.30 SLEEP APNEA, UNSPECIFIED TYPE: ICD-10-CM

## 2025-05-08 DIAGNOSIS — E55.9 VITAMIN D DEFICIENCY: ICD-10-CM

## 2025-05-08 DIAGNOSIS — E66.3 OVERWEIGHT (BMI 25.0-29.9): ICD-10-CM

## 2025-05-08 DIAGNOSIS — N52.9 ERECTILE DYSFUNCTION, UNSPECIFIED ERECTILE DYSFUNCTION TYPE: ICD-10-CM

## 2025-05-08 DIAGNOSIS — Z87.891 FORMER SMOKER: ICD-10-CM

## 2025-05-08 DIAGNOSIS — R73.9 ELEVATED BLOOD SUGAR: ICD-10-CM

## 2025-05-08 DIAGNOSIS — E78.2 HYPERLIPIDEMIA, MIXED: Primary | ICD-10-CM

## 2025-05-08 PROCEDURE — 3078F DIAST BP <80 MM HG: CPT | Performed by: INTERNAL MEDICINE

## 2025-05-08 PROCEDURE — 1159F MED LIST DOCD IN RCRD: CPT | Performed by: INTERNAL MEDICINE

## 2025-05-08 PROCEDURE — 1036F TOBACCO NON-USER: CPT | Performed by: INTERNAL MEDICINE

## 2025-05-08 PROCEDURE — 3008F BODY MASS INDEX DOCD: CPT | Performed by: INTERNAL MEDICINE

## 2025-05-08 PROCEDURE — G2211 COMPLEX E/M VISIT ADD ON: HCPCS | Performed by: INTERNAL MEDICINE

## 2025-05-08 PROCEDURE — 1160F RVW MEDS BY RX/DR IN RCRD: CPT | Performed by: INTERNAL MEDICINE

## 2025-05-08 PROCEDURE — 3074F SYST BP LT 130 MM HG: CPT | Performed by: INTERNAL MEDICINE

## 2025-05-08 PROCEDURE — 99214 OFFICE O/P EST MOD 30 MIN: CPT | Performed by: INTERNAL MEDICINE

## 2025-05-08 RX ORDER — SILDENAFIL 100 MG/1
100 TABLET, FILM COATED ORAL DAILY PRN
Qty: 12 TABLET | Refills: 3 | Status: SHIPPED | OUTPATIENT
Start: 2025-05-08 | End: 2026-05-08

## 2025-05-08 RX ORDER — LATANOPROST 50 UG/ML
1 SOLUTION/ DROPS OPHTHALMIC NIGHTLY
COMMUNITY
Start: 2025-03-24

## 2025-05-08 RX ORDER — PREDNISOLONE ACETATE 10 MG/ML
1 SUSPENSION/ DROPS OPHTHALMIC DAILY
COMMUNITY
Start: 2025-04-10

## 2025-05-08 ASSESSMENT — LIFESTYLE VARIABLES
HOW MANY STANDARD DRINKS CONTAINING ALCOHOL DO YOU HAVE ON A TYPICAL DAY: PATIENT DOES NOT DRINK
SKIP TO QUESTIONS 9-10: 1
HAS A RELATIVE, FRIEND, DOCTOR, OR ANOTHER HEALTH PROFESSIONAL EXPRESSED CONCERN ABOUT YOUR DRINKING OR SUGGESTED YOU CUT DOWN: NO
AUDIT-C TOTAL SCORE: 0
HAVE YOU OR SOMEONE ELSE BEEN INJURED AS A RESULT OF YOUR DRINKING: NO
HOW OFTEN DO YOU HAVE SIX OR MORE DRINKS ON ONE OCCASION: NEVER
AUDIT TOTAL SCORE: 0
HOW OFTEN DO YOU HAVE A DRINK CONTAINING ALCOHOL: NEVER

## 2025-05-08 ASSESSMENT — PATIENT HEALTH QUESTIONNAIRE - PHQ9
SUM OF ALL RESPONSES TO PHQ9 QUESTIONS 1 AND 2: 0
1. LITTLE INTEREST OR PLEASURE IN DOING THINGS: NOT AT ALL
2. FEELING DOWN, DEPRESSED OR HOPELESS: NOT AT ALL

## 2025-05-08 NOTE — PROGRESS NOTES
Assessment & Plan  Sleep apnea  Sleep apnea diagnosed 20 years ago. CPAP not used due to discomfort. Repeat sleep study needed due to time elapsed and weight gain. Weight loss emphasized as primary treatment. Split study discussed for CPAP settings.  - Order repeat sleep study before sleep specialist consultation.  - Encourage weight loss as primary treatment.    Erectile dysfunction and also has had symptoms of BPH  Current medication inadequate. Discussed dosage increase and urologist referral. Considered medication interactions.  - Increase erectile dysfunction medication to 100 mg.  Patient's BPH symptoms are improved with the doxazosin but still have some frequency  - Refer to urologist for further evaluation and management.  - Consider checking testosterone levels at next blood test.    Ventral hernia  Small ventral hernia likely due to rectus abdominis defect. No pain or complications. Surgical intervention cosmetic unless pain develops.  - Encourage abdominal muscle strengthening exercises.    He has history of vitamin D deficiency and he takes the oral vitamin D.  His levels will be followed with next blood draw    We discussed about his overweight and he understands the importance of lifestyle modification to control his weight.    Wellness Visit  Routine visit. Reports feeling better, less tired. November 2024 stress test and echocardiogram show no concerning changes. Continues daily walking, improving endurance. Discussed lifestyle changes for overall health and sleep apnea management.  - Continue current medications.  - Encourage lifestyle changes, including weight loss and regular exercise.  - Order fasting blood test before next visit.  - Schedule follow-up in 5-6 months.    Alcohol use  No alcohol consumption since last visit. Previously had presyncope episodes potentially related to alcohol. Discussed risks of alcohol consumption.  - Continue to abstain from alcohol or limit intake to moderate  levels.       Assessment/Plan     Problem List Items Addressed This Visit       Essential hypertension, benign    Relevant Orders    CBC and Auto Differential    Hyperlipidemia, mixed - Primary    Relevant Orders    Comprehensive Metabolic Panel    Lipid Panel    Sleep apnea    Relevant Orders    In-Center Sleep Study (Non-Sleep Provider)    Vitamin D deficiency    Relevant Orders    Vitamin D 25-Hydroxy,Total (for eval of Vitamin D levels)    Former smoker    Overweight (BMI 25.0-29.9)    Elevated blood sugar    Relevant Orders    Hemoglobin A1C    Alcohol use     Other Visit Diagnoses         Erectile dysfunction, unspecified erectile dysfunction type        Relevant Medications    sildenafil (Viagra) 100 mg tablet    Other Relevant Orders    Referral to Urology    Testosterone, total and free            Subjective 0    Patient ID: Osman Alaniz is a 69 y.o. male who presents for Results.    History of Present Illness  Osman Alaniz is a 69 year old male with history of possible pre-syncope and sleep apnea who presents for follow-up regarding his heart health and sleep apnea management.    He has not experienced any new symptoms since his last visit and feels better overall. He engages in regular walking, reducing his walking time from one hour and ten minutes to fifty minutes for the same route, which he estimates to be about a mile and a half. He walks daily, starting at 6:15 AM and returning by 7:00 AM. Additionally, he performs 40 ab roller exercises before bed, having gradually increased from 10 to 40 over time. He recalls a previous stress test which showed no changes in his EKG, and an echocardiogram from November 2024 that was also normal. No new symptoms occur during physical activities, and he reports feeling less tired.    Regarding his sleep apnea, he recalls a sleep study conducted approximately 20 years ago, which diagnosed him with sleep apnea. He attempted to use a CPAP mask during a follow-up  study but found it uncomfortable due to his need to sleep on his side or stomach. He has gained approximately 11 pounds since his last weigh-in, with his current weight only checked during medical visits as he does not own a scale.    He has not consumed alcohol since his last visit, despite a history of binge drinking and a presyncopal episode at a bar after consuming two beers. He is currently working part-time, three days a week, for about seven and a half hours each day.    He reports issues with erectile dysfunction, noting that the medication he is taking, which is a 50 mg dose, is not effective. He experiences initial excitement but loses it quickly. He has not been drinking alcohol before these episodes and is not currently in a steady relationship.       Surgical History[1]     ROS rest of the review of systems no acute complaints.    Family History[2]   Social History     Socioeconomic History    Marital status: Single     Spouse name: Not on file    Number of children: Not on file    Years of education: Not on file    Highest education level: Not on file   Occupational History    Not on file   Tobacco Use    Smoking status: Former     Types: Cigarettes    Smokeless tobacco: Never   Vaping Use    Vaping status: Never Used   Substance and Sexual Activity    Alcohol use: Not Currently    Drug use: Never    Sexual activity: Not on file   Other Topics Concern    Not on file   Social History Narrative    Not on file     Social Drivers of Health     Financial Resource Strain: Not on file   Food Insecurity: No Food Insecurity (10/3/2024)    Hunger Vital Sign     Worried About Running Out of Food in the Last Year: Never true     Ran Out of Food in the Last Year: Never true   Transportation Needs: Not on file   Physical Activity: Not on file   Stress: Not on file   Social Connections: Not on file   Intimate Partner Violence: Not on file   Housing Stability: Not on file      Doxycycline and Penicillins   Current  Rx[3]       Objective     Vitals:    05/08/25 0850   BP: 112/56   Pulse: 80        Physical Exam  Moderate obese, well-nourished with no apparent distress. Alert oriented,  Skin: Normal turgor.   Head: Normocephalic, atraumatic.  Eyes: No acute changes, both eyes and cornea has no irritation. No conjunctivitis  No pallor of conjunctivae. Mucous membranes are moist  Neck: Supple. No JVD. No carotid bruit. No thyromegaly. No cervical lymphadenopathy.  No clubbing. Peripheral osteoarthritis noted.  Chest: Vesicular breathing Bilaterally good air entry. No wheezing. No crackles.  Heart: Regular rate and rhythm. S1, S2 positive. No murmur.  Ventral hernia  Abdomen: Soft and nontender. Bowel sounds are positive. No organomegaly.  Extremities: Bilaterally has leg no leg swelling.  Bilaterally 2+ dorsalis pedis pulses.  No calf tenderness. Homans sign is negative.  Neuro Exam: No focal signs. Gait is normal.         Problem List Items Addressed This Visit       Essential hypertension, benign    Relevant Orders    CBC and Auto Differential    Hyperlipidemia, mixed - Primary    Relevant Orders    Comprehensive Metabolic Panel    Lipid Panel    Sleep apnea    Relevant Orders    In-Center Sleep Study (Non-Sleep Provider)    Vitamin D deficiency    Relevant Orders    Vitamin D 25-Hydroxy,Total (for eval of Vitamin D levels)    Former smoker    Overweight (BMI 25.0-29.9)    Elevated blood sugar    Relevant Orders    Hemoglobin A1C    Alcohol use     Other Visit Diagnoses         Erectile dysfunction, unspecified erectile dysfunction type        Relevant Medications    sildenafil (Viagra) 100 mg tablet    Other Relevant Orders    Referral to Urology    Testosterone, total and free             Orders Placed This Encounter   Procedures    CBC and Auto Differential     Standing Status:   Future     Number of Occurrences:   1     Expected Date:   5/8/2025     Expiration Date:   5/8/2026     Release result to [employer]:   Immediate     Comprehensive Metabolic Panel     Standing Status:   Future     Number of Occurrences:   1     Expected Date:   5/8/2025     Expiration Date:   5/8/2026     [facility]    Lipid Panel     fasting     Standing Status:   Future     Expected Date:   8/8/2025     Expiration Date:   5/8/2026     [facility]   Immediate [1]    Vitamin D 25-Hydroxy,Total (for eval of Vitamin D levels)     Standing Status:   Future     Expected Date:   8/8/2025     Expiration Date:   5/8/2026     [facility] to [facility]:   Immediate    Hemoglobin A1C     Standing Status:   Future     Expected Date:   8/8/2025     Expiration Date:   5/8/2026     Release result to [facility]:   Immediate    Testosterone, total and free     Standing Status:   Future     Expected Date:   8/8/2025     Expiration Date:   5/8/2026     [facility]   Immediate [1]    Referral to Urology     No Preference     Standing Status:   Future     Expected Date:   5/8/2025     Expiration Date:   5/8/2026     Referral Priority:   Routine     Referral Type:   Consultation     Referral Reason:   Specialty Services Required     Requested Specialty:   Urology     Number of Visits Requested:   1    In-Center Sleep Study (Non-Sleep Provider)     REQUIRED ORDER DETAILS:    1) PSG Indications: - EDMUND: repeat testing [G47.33]    2) Comorbidities: - None    3) PSG Precautions: - None    4) Other monitoring intervention: - None    Specialty needs instructions:  Does the patient need caretaker to stay for test? No  Is this patient wheelchair bound? No  Does the patient have other special needs? No  Are there other special instructions? No     Standing Status:   Future     Expected Date:   5/8/2025     Expiration Date:   5/8/2026     Scheduling Instructions:      Call  the  Sleep Center ([phone]) to speak with a sleep testing center  to book your overnight sleep study procedure at one of our adult and pediatric-friendly  sleep labs. Overnight sleep studies may be scheduled on a weekday or weekend.              We have child-life services on a case-by-case basis at the [facility] location. We also perform daytime testing for shift workers on a case by case basis.             For the study: Bring usual medications and nightly routine items for your sleep study.  You may wish to bring a snack and nightly routine items you feel would be important to help you sleep (e.g. favorite pillow). Bring your sleep logs/requested paperwork to your sleep study appointment.            Results of your sleep study will be given to the ordering clinician. Please contact their office for results or followup as directed by your clinician. For additional information about the sleep medicine services, please call [phone].            Locations for sleep studies are [facility]), [facility], [place], [facility], [place], [facility], and [place].     Where will this be performed:   [place]     Study Type:   Split night sleep study     Release result to [employer]:   Immediate        @[employer][<insert lab base name>:<insert number of results or*for all>,<repeat format for multiple labs>]@  Lab Results   Component Value Date    CHOL 187 03/31/2025    LDLCALC 92 03/31/2025    CHHDL 3.4 03/31/2025       Imaging  No results found.    Cardiology, Vascular, and Other Imaging  No other imaging results found for the past 7 days            This medical note was created with the assistance of artificial intelligence (AI) for documentation purposes. The content has been reviewed and confirmed by the healthcare provider for accuracy and completeness. Patient consented to the use of audio recording and use of AI during their visit.          [1]   Past Surgical History:  Procedure Laterality Date    OTHER SURGICAL HISTORY  10/02/2014    Cornea Transplant    OTHER SURGICAL HISTORY  08/30/2022    Complete colonoscopy    OTHER SURGICAL  HISTORY  10/26/2021    Dental implant procedure    OTHER SURGICAL HISTORY  10/26/2021    Oral surgery   [2]   Family History  Problem Relation Name Age of Onset    Diabetes Mother      Pancreatic cancer Mother      Brain cancer Father     [3]   Current Outpatient Medications   Medication Sig Dispense Refill    allopurinol (Zyloprim) 100 mg tablet TAKE TWO TABLETS BY MOUTH DAILY 180 tablet 1    atorvastatin (Lipitor) 40 mg tablet TAKE ONE TABLET BY MOUTH DAILY AT BEDTIME 90 tablet 1    cholecalciferol (Vitamin D-3) 25 MCG (1000 UT) capsule Take 1 capsule (25 mcg) by mouth 2 times a day.      doxazosin (Cardura) 2 mg tablet Take 1 tablet (2 mg) by mouth 2 times a day. 180 tablet 1    latanoprost (Xalatan) 0.005 % ophthalmic solution Administer 1 drop into the right eye once daily at bedtime.      losartan-hydrochlorothiazide (Hyzaar) 50-12.5 mg tablet Take 1 tablet by mouth once daily. 90 tablet 1    moxifloxacin (Vigamox) 0.5 % ophthalmic solution Administer 1 drop into the right eye 2 times a day. 10 mL 2    pantoprazole (ProtoNix) 40 mg EC tablet TAKE ONE TABLET BY MOUTH DAILY 90 tablet 0    prednisoLONE acetate (Pred-Forte) 1 % ophthalmic suspension Administer 1 drop into the right eye once daily.      sildenafil (Viagra) 100 mg tablet Take 1 tablet (100 mg) by mouth once daily as needed for erectile dysfunction. 12 tablet 3     No current facility-administered medications for this visit.

## 2025-05-16 ENCOUNTER — APPOINTMENT (OUTPATIENT)
Dept: OPHTHALMOLOGY | Facility: CLINIC | Age: 70
End: 2025-05-16
Payer: MEDICARE

## 2025-05-16 DIAGNOSIS — T86.8409 CORNEAL GRAFT REJECTION: ICD-10-CM

## 2025-05-16 DIAGNOSIS — Z94.7 CORNEAL TRANSPLANT STATUS: Primary | ICD-10-CM

## 2025-05-16 DIAGNOSIS — H02.88A MEIBOMIAN GLAND DYSFUNCTION (MGD) OF UPPER AND LOWER LIDS OF BOTH EYES: ICD-10-CM

## 2025-05-16 DIAGNOSIS — H02.88B MEIBOMIAN GLAND DYSFUNCTION (MGD) OF UPPER AND LOWER LIDS OF BOTH EYES: ICD-10-CM

## 2025-05-16 ASSESSMENT — CONF VISUAL FIELD
OD_INFERIOR_NASAL_RESTRICTION: 0
OS_INFERIOR_TEMPORAL_RESTRICTION: 3
OD_SUPERIOR_TEMPORAL_RESTRICTION: 0
OD_INFERIOR_TEMPORAL_RESTRICTION: 0
OS_SUPERIOR_NASAL_RESTRICTION: 3
OD_SUPERIOR_NASAL_RESTRICTION: 0
OD_NORMAL: 1
OS_INFERIOR_NASAL_RESTRICTION: 3
OS_SUPERIOR_TEMPORAL_RESTRICTION: 3
METHOD: COUNTING FINGERS

## 2025-05-16 ASSESSMENT — REFRACTION_CURRENTRX
OD_DIAMETER: 16.0
OD_BASECURVE: 9.8
OD_SPHERE: -3.00
OD_CYLINDER: SPHERE

## 2025-05-16 ASSESSMENT — ENCOUNTER SYMPTOMS
RESPIRATORY NEGATIVE: 0
MUSCULOSKELETAL NEGATIVE: 0
GASTROINTESTINAL NEGATIVE: 0
CONSTITUTIONAL NEGATIVE: 0
CARDIOVASCULAR NEGATIVE: 0
EYES NEGATIVE: 1
HEMATOLOGIC/LYMPHATIC NEGATIVE: 0
ENDOCRINE NEGATIVE: 0
PSYCHIATRIC NEGATIVE: 0
NEUROLOGICAL NEGATIVE: 0
ALLERGIC/IMMUNOLOGIC NEGATIVE: 0

## 2025-05-16 ASSESSMENT — EXTERNAL EXAM - LEFT EYE: OS_EXAM: NORMAL

## 2025-05-16 ASSESSMENT — SLIT LAMP EXAM - LIDS: COMMENTS: NORMAL, MILD MGD

## 2025-05-16 ASSESSMENT — VISUAL ACUITY
OS_SC: 20/20-1
METHOD: SNELLEN - LINEAR
OD_CC: 20/200-1
CORRECTION_TYPE: CONTACTS

## 2025-05-16 ASSESSMENT — EXTERNAL EXAM - RIGHT EYE: OD_EXAM: NORMAL

## 2025-05-16 NOTE — PROGRESS NOTES
Assessment/Plan   Diagnoses and all orders for this visit:  Corneal transplant status  Corneal graft rejection  Meibomian gland dysfunction (MGD) of upper and lower lids of both eyes  Conjunctivitis (presumed bacterial) overall improved at this visit compared to 1 month ago. Still slight signs of mucus discharge present, may be allergic, decided to replace the current Kontur lens with a new Kontur lens with the same parameters. Placed a new Kontur BSCL OD today. Ordering a new 4-pack of Kontur lenses. Follow up for cornea health check in 3 months.    Stay on Moxi instead of polytrim    Other meds same    Keep glc and cornea appointments as scheduled

## 2025-05-16 NOTE — Clinical Note
Both eyes (OU) Contact Lens Order Contact Lens Current Rx    Current Contact Lens Rx (Ordered)      Brand Base Curve Diameter Sphere Cylinder   Right Kontur 9.8 16.0 -3.00 Sphere          Quantity: 1 Package: 4 P Appointment needed? No Medically necessary? Yes Ship To: [place] Additional instructions: keep at Lb for next BSCL exchange

## 2025-06-09 ENCOUNTER — APPOINTMENT (OUTPATIENT)
Age: 70
End: 2025-06-09
Payer: MEDICARE

## 2025-06-09 VITALS
DIASTOLIC BLOOD PRESSURE: 60 MMHG | SYSTOLIC BLOOD PRESSURE: 116 MMHG | BODY MASS INDEX: 27.12 KG/M2 | HEIGHT: 72 IN | HEART RATE: 79 BPM

## 2025-06-09 DIAGNOSIS — N52.9 ERECTILE DYSFUNCTION, UNSPECIFIED ERECTILE DYSFUNCTION TYPE: ICD-10-CM

## 2025-06-09 PROCEDURE — 3078F DIAST BP <80 MM HG: CPT | Performed by: UROLOGY

## 2025-06-09 PROCEDURE — 1159F MED LIST DOCD IN RCRD: CPT | Performed by: UROLOGY

## 2025-06-09 PROCEDURE — 99213 OFFICE O/P EST LOW 20 MIN: CPT | Performed by: UROLOGY

## 2025-06-09 PROCEDURE — 3074F SYST BP LT 130 MM HG: CPT | Performed by: UROLOGY

## 2025-06-09 NOTE — PROGRESS NOTES
UROLOGIC FOLLOW-UP VISIT     PROBLEM LIST:  1. Erectile dysfunction, unspecified erectile dysfunction type  Referral to Urology           HISTORY OF PRESENT ILLNESS:   Osman Alaniz is a 69 y.o. M with HTN, HLD, former smoker, DM, sleep apnea referred for ED.     Patient reports that he was recently uptitrated to 100mg Sildenafil which has been working well but he has some difficulty maintaining erections. He does note some psychological component. He does have AM erections. States he is taking Sildenfil on empty stomach and 1 hour prior to having sex. He denies any side effects including palpitations, dizziness, flushing, vision changes etc.      He notes some urinary frequency during the day only. Notes he drinks large volumes of water and Gatorade zero. Otherwise without obstructive symptoms or nocturia.     PAST MEDICAL HISTORY:  Medical History[1]    PAST SURGICAL HISTORY:  Surgical History[2]     ALLERGIES:   Allergies[3]     MEDICATIONS:   Medications Ordered Prior to Encounter[4]     SOCIAL HISTORY:  Patient  reports that he has quit smoking. His smoking use included cigarettes. He has never used smokeless tobacco. He reports that he does not currently use alcohol. He reports that he does not use drugs.   Social History     Socioeconomic History    Marital status: Single     Spouse name: Not on file    Number of children: Not on file    Years of education: Not on file    Highest education level: Not on file   Occupational History    Not on file   Tobacco Use    Smoking status: Former     Types: Cigarettes    Smokeless tobacco: Never   Vaping Use    Vaping status: Never Used   Substance and Sexual Activity    Alcohol use: Not Currently    Drug use: Never    Sexual activity: Not on file   Other Topics Concern    Not on file   Social History Narrative    Not on file     Social Drivers of Health     Financial Resource Strain: Not on file   Food Insecurity: No Food Insecurity (10/3/2024)    Hunger Vital Sign     " Worried About Running Out of Food in the Last Year: Never true     Ran Out of Food in the Last Year: Never true   Transportation Needs: Not on file   Physical Activity: Not on file   Stress: Not on file   Social Connections: Not on file   Intimate Partner Violence: Not on file   Housing Stability: Not on file       FAMILY HISTORY:  Family History[5]    REVIEW OF SYSTEMS:  Negative except as reported above    PHYSICAL EXAM:  Visit Vitals  /60   Pulse 79     Constitutional: Well-developed and well-nourished. No distress.    Head: Normocephalic and atraumatic.    Neck: Normal range of motion.     Pulmonary/Chest: Effort normal. No respiratory distress.   Abdominal: Non-distended.  Integumentary: No rash or lesions visualized.  Musculoskeletal: Normal range of motion.    Neurological: Alert and oriented.  Psychiatric: Normal mood and affect. Thought content normal.      LABORATORY REVIEW:   Lab Results   Component Value Date    BUN 18 03/31/2025    CREATININE 1.28 03/31/2025    EGFR 61 03/31/2025     03/31/2025    K 4.9 03/31/2025     03/31/2025    CO2 29 03/31/2025    CALCIUM 9.6 03/31/2025      Lab Results   Component Value Date    WBC 6.0 03/31/2025    RBC 5.27 03/31/2025    HGB 15.2 03/31/2025    HCT 46.0 03/31/2025    MCV 87.3 03/31/2025    MCH 28.8 03/31/2025    MCHC 33.0 03/31/2025    RDW 14.3 03/31/2025     03/31/2025    MPV 10.9 03/31/2025        No results found for: \"PSA\"          Assessment:      1. Erectile dysfunction, unspecified erectile dysfunction type  Referral to Urology        Osman Alaniz is a 69 y.o. M with HTN, HLD, former smoker, DM, sleep apnea referred for ED.      Plan:   - Continue Sildenafil 100mg as this appears to be working for him. Reviewed instructions for administration. I also recommended trying a penile ring as he probably has a decent amount of venous leak based on his symptoms. Continue to manage DM, HTN, and weight.   - We discussed ICI and IPP as part " of the management pathway but we both agree he is not quite there yet   - He has a T ordered by his PCP. He will get this in the next few weeks   - His urinary symptoms are not bothersome enough to start medication at this time. He understands that these symptoms may worsen with time and which time we can reassess   - RTC 3 months for symptom check        [1]   Past Medical History:  Diagnosis Date    Ear problems     Essential (primary) hypertension 12/29/2013    Hypertension    Localized swelling of both lower legs 11/13/2024    Mydriasis 10/25/2019    Persistent mydriasis    Noninfective gastroenteritis and colitis, unspecified 08/31/2022    Chronic nonspecific colitis    Other conditions influencing health status 12/29/2013    Bullous Keratopathy    Other specified postprocedural states 06/11/2018    H/O vitrectomy    Pure hypercholesterolemia, unspecified     High cholesterol   [2]   Past Surgical History:  Procedure Laterality Date    OTHER SURGICAL HISTORY  10/02/2014    Cornea Transplant    OTHER SURGICAL HISTORY  08/30/2022    Complete colonoscopy    OTHER SURGICAL HISTORY  10/26/2021    Dental implant procedure    OTHER SURGICAL HISTORY  10/26/2021    Oral surgery   [3]   Allergies  Allergen Reactions    Doxycycline Itching and Nausea Only    Penicillins Itching   [4]   Current Outpatient Medications on File Prior to Visit   Medication Sig Dispense Refill    allopurinol (Zyloprim) 100 mg tablet TAKE TWO TABLETS BY MOUTH DAILY 180 tablet 1    atorvastatin (Lipitor) 40 mg tablet TAKE ONE TABLET BY MOUTH DAILY AT BEDTIME 90 tablet 1    cholecalciferol (Vitamin D-3) 25 MCG (1000 UT) capsule Take 1 capsule (25 mcg) by mouth 2 times a day.      doxazosin (Cardura) 2 mg tablet Take 1 tablet (2 mg) by mouth 2 times a day. 180 tablet 1    latanoprost (Xalatan) 0.005 % ophthalmic solution Administer 1 drop into the right eye once daily at bedtime.      losartan-hydrochlorothiazide (Hyzaar) 50-12.5 mg tablet Take 1  tablet by mouth once daily. 90 tablet 1    moxifloxacin (Vigamox) 0.5 % ophthalmic solution Administer 1 drop into the right eye 2 times a day. 10 mL 2    pantoprazole (ProtoNix) 40 mg EC tablet TAKE ONE TABLET BY MOUTH DAILY 90 tablet 0    prednisoLONE acetate (Pred-Forte) 1 % ophthalmic suspension Administer 1 drop into the right eye once daily.      sildenafil (Viagra) 100 mg tablet Take 1 tablet (100 mg) by mouth once daily as needed for erectile dysfunction. 12 tablet 3     No current facility-administered medications on file prior to visit.   [5]   Family History  Problem Relation Name Age of Onset    Diabetes Mother      Pancreatic cancer Mother      Brain cancer Father

## 2025-06-16 ENCOUNTER — CLINICAL SUPPORT (OUTPATIENT)
Dept: SLEEP MEDICINE | Facility: CLINIC | Age: 70
End: 2025-06-16
Payer: MEDICARE

## 2025-06-16 VITALS
BODY MASS INDEX: 27.1 KG/M2 | HEIGHT: 72 IN | SYSTOLIC BLOOD PRESSURE: 147 MMHG | DIASTOLIC BLOOD PRESSURE: 83 MMHG | WEIGHT: 200.07 LBS

## 2025-06-16 DIAGNOSIS — G47.30 SLEEP APNEA, UNSPECIFIED TYPE: ICD-10-CM

## 2025-06-16 ASSESSMENT — SLEEP AND FATIGUE QUESTIONNAIRES
HOW LIKELY ARE YOU TO NOD OFF OR FALL ASLEEP WHILE SITTING AND READING: WOULD NEVER DOZE
HOW LIKELY ARE YOU TO NOD OFF OR FALL ASLEEP WHEN YOU ARE A PASSENGER IN A CAR FOR AN HOUR WITHOUT A BREAK: WOULD NEVER DOZE
SITING INACTIVE IN A PUBLIC PLACE LIKE A CLASS ROOM OR A MOVIE THEATER: WOULD NEVER DOZE
HOW LIKELY ARE YOU TO NOD OFF OR FALL ASLEEP IN A CAR, WHILE STOPPED FOR A FEW MINUTES IN TRAFFIC: WOULD NEVER DOZE
HOW LIKELY ARE YOU TO NOD OFF OR FALL ASLEEP WHILE SITTING QUIETLY AFTER LUNCH WITHOUT ALCOHOL: WOULD NEVER DOZE
HOW LIKELY ARE YOU TO NOD OFF OR FALL ASLEEP WHILE LYING DOWN TO REST IN THE AFTERNOON WHEN CIRCUMSTANCES PERMIT: MODERATE CHANCE OF DOZING
ESS-CHAD TOTAL SCORE: 4
HOW LIKELY ARE YOU TO NOD OFF OR FALL ASLEEP WHILE WATCHING TV: MODERATE CHANCE OF DOZING
HOW LIKELY ARE YOU TO NOD OFF OR FALL ASLEEP WHILE SITTING AND TALKING TO SOMEONE: WOULD NEVER DOZE

## 2025-06-17 NOTE — PROGRESS NOTES
[facility] TECH NOTE:     Patient: Osman Alaniz   MRN//AGE: [id number]  1955  69 y.o.   Technologist: Lorna Pierre   Room: 1   Service Date: 2025        Sleep Testing Location: [facility]:     TECHNOLOGIST SLEEP STUDY PROCEDURE NOTE:   This sleep study is being conducted according to the policies and procedures outlined by the AAS accreditation standards.  The sleep study procedure and processes involved during this appointment was explained to the patient/patient’s family, questions were answered. The patient/family verbalized understanding.      The patient is a 69 y.o. year old male scheduled for a Diagnostic PSG Split night with montage of: Diagnostic PSG Split night. he arrived for his appointment.      The study that was ultimately completed was a Diagnostic PSG with montage of: Diagnostic PSG.    The full study Was completed.  Patient questionnaires completed?: yes     Consents signed? yes    Initial Fall Risk Screening:     Osman has not fallen in the last 6 months. his did not result in injury. Osman does not have a fear of falling. He does not need assistance with sitting, standing, or walking. he does not need assistance walking in his home. he does not need assistance in an unfamiliar setting. The patient is not using an assistive device.     Brief Study observations: All sensors applied.  Patient did not qualify for ordered split. Full study completed.     Deviation to order/protocol and reason: None       Other:None    After the procedure, the patient/family was informed to ensure followup with ordering clinician for testing results.      Technologist: JUAN Ovalles

## 2025-06-18 ENCOUNTER — APPOINTMENT (OUTPATIENT)
Dept: OPHTHALMOLOGY | Facility: CLINIC | Age: 70
End: 2025-06-18
Payer: MEDICARE

## 2025-06-18 DIAGNOSIS — H40.10X2 OPEN-ANGLE GLAUCOMA OF LEFT EYE, MODERATE STAGE, UNSPECIFIED OPEN-ANGLE GLAUCOMA TYPE: Primary | ICD-10-CM

## 2025-06-18 PROCEDURE — 99213 OFFICE O/P EST LOW 20 MIN: CPT | Performed by: OPHTHALMOLOGY

## 2025-06-18 ASSESSMENT — EXTERNAL EXAM - LEFT EYE: OS_EXAM: NORMAL

## 2025-06-18 ASSESSMENT — SLIT LAMP EXAM - LIDS: COMMENTS: NORMAL, MILD MGD

## 2025-06-18 ASSESSMENT — TONOMETRY
IOP_METHOD: GOLDMANN APPLANATION
OS_IOP_MMHG: 11

## 2025-06-18 ASSESSMENT — VISUAL ACUITY
METHOD: SNELLEN - LINEAR
OS_SC+: -2
OD_SC: 20/400 SEARCHING
OS_SC: 20/20

## 2025-06-18 ASSESSMENT — EXTERNAL EXAM - RIGHT EYE: OD_EXAM: NORMAL

## 2025-06-18 ASSESSMENT — ENCOUNTER SYMPTOMS: EYES NEGATIVE: 1

## 2025-06-23 DIAGNOSIS — E78.2 HYPERLIPIDEMIA, MIXED: ICD-10-CM

## 2025-06-23 DIAGNOSIS — E79.0 HYPERURICEMIA: ICD-10-CM

## 2025-06-23 DIAGNOSIS — I10 ESSENTIAL HYPERTENSION, BENIGN: ICD-10-CM

## 2025-06-23 DIAGNOSIS — K21.9 GASTROESOPHAGEAL REFLUX DISEASE WITHOUT ESOPHAGITIS: ICD-10-CM

## 2025-06-23 DIAGNOSIS — N52.9 ERECTILE DYSFUNCTION, UNSPECIFIED ERECTILE DYSFUNCTION TYPE: ICD-10-CM

## 2025-06-23 RX ORDER — PANTOPRAZOLE SODIUM 40 MG/1
40 TABLET, DELAYED RELEASE ORAL DAILY
Qty: 90 TABLET | Refills: 1 | Status: SHIPPED | OUTPATIENT
Start: 2025-06-23

## 2025-06-23 RX ORDER — ATORVASTATIN CALCIUM 40 MG/1
40 TABLET, FILM COATED ORAL NIGHTLY
Qty: 90 TABLET | Refills: 1 | Status: SHIPPED | OUTPATIENT
Start: 2025-06-23

## 2025-06-23 RX ORDER — SILDENAFIL 100 MG/1
100 TABLET, FILM COATED ORAL DAILY PRN
Qty: 12 TABLET | Refills: 1 | Status: SHIPPED | OUTPATIENT
Start: 2025-06-23 | End: 2026-06-23

## 2025-06-23 RX ORDER — LOSARTAN POTASSIUM AND HYDROCHLOROTHIAZIDE 12.5; 5 MG/1; MG/1
1 TABLET ORAL DAILY
Qty: 90 TABLET | Refills: 1 | Status: SHIPPED | OUTPATIENT
Start: 2025-06-23 | End: 2026-06-23

## 2025-06-23 RX ORDER — DOXAZOSIN 2 MG/1
2 TABLET ORAL 2 TIMES DAILY
Qty: 180 TABLET | Refills: 1 | Status: SHIPPED | OUTPATIENT
Start: 2025-06-23 | End: 2026-06-23

## 2025-06-23 RX ORDER — ALLOPURINOL 100 MG/1
200 TABLET ORAL DAILY
Qty: 180 TABLET | Refills: 1 | Status: SHIPPED | OUTPATIENT
Start: 2025-06-23

## 2025-06-27 ENCOUNTER — TELEPHONE (OUTPATIENT)
Dept: PRIMARY CARE | Facility: CLINIC | Age: 70
End: 2025-06-27
Payer: MEDICARE

## 2025-06-27 NOTE — TELEPHONE ENCOUNTER
----- Message from Marquis Stein sent at 6/26/2025  3:36 PM EDT -----  The summary of the sleep study reading as follows    Based on the AASM recommended definition, the sleep study is consistent with   a diagnosis of moderate obstructive sleep apnea.  The Sp02 shanta was 86.0%.   Based on the CMS definition, the sleep study is consistent with a diagnosis   of mild obstructive sleep apnea. Respiratory events were severe in REM sleep.   The Sp02 shanta was 86.0%. The discrepancy in severity between the AASM and   CMS definition is primarily due to the presence of arousal-based disordered   breathing events observed in this patient.    Will review and discussed the treatment options during the office follow-up.  ----- Message -----  From: Ziyad Mackey - Lab Results In  Sent: 6/26/2025  12:04 AM EDT  To: Marquis Stein MD

## 2025-06-30 ENCOUNTER — APPOINTMENT (OUTPATIENT)
Dept: SLEEP MEDICINE | Facility: CLINIC | Age: 70
End: 2025-06-30
Payer: MEDICARE

## 2025-06-30 NOTE — PROGRESS NOTES
Patient: Osman Alaniz  : 1955 AGE: 69 y.o. SEX:male   MRN: [id number]   Provider: SMITA Reyes-CNP     Location [facility]   Service Date: 7/10/2025     PCP: Marquis Stein MD   Referred by: Marquis Stein MD          [facility]  New Visit Note      HISTORY OF PRESENT ILLNESS     Osman Alaniz is a 69 y.o. male with a h/o EDMUND, hypertension, hyperlipidemia, BPH who presents to [facility].    7/10/25: NPV with concerns of EDMUND management, recent sleep study. Tested due to un refreshed sleep & EDS. ---> start APAP via [facility]       SLEEP STUDY HISTORY (personally reviewed raw data such as interpretation report, data sheet, hypnogram, and titration table if available and applicable)  - PSG 2025-showing mild EDMUND on CMS criteria with RDI 4% 6.5, SpO2 shanta 86%    SLEEP-WAKE SCHEDULE    Sleep Patterns:  In terms of the patient's sleep/wake cycle, he generally gets into bed at approximately 9 PM.  his latency to sleep onset after lights out is 30min. During the night, the patient generally awakens 3 times nightly due to unknown reason. These awakenings are usually brief in duration. Final wake time on weekday mornings is around 5 AM. TST 7-8hrs/night. Naps most days for 20 min which is refreshing.     Compared to weekdays, the patient's sleep schedule is  similar on the weekends.    Breathing during sleep: snoring and snorting during sleep  Behaviors at night: No   Sleep paralysis: No   Hypnogogic or hypnopompic hallucinations: No   Cataplexy: No     RLS screen: Patient denies RLS symptoms.    Daytime Symptoms:  On awakening patient reports: wake unrefreshed  Patient report some daytime symptoms including: DAYTIME SYMPTOMS: reports sleep inertia    Sleep environment:  Preferred sleep position: stomach and side  Room is dark: Yes  Room is quiet: Yes  Room is cool: Yes  Bed comfort: good    SLEEP HABITS  Caffeine consumption: Yes,  "Patient consumes caffeine beverage regularly, about 1 cup(s)/day.  Alcohol consumption: Yes, Patient consumes alcohol regularly, about 3 drink(s)/week.  Smoking: No  Marijuana: No  Sleep aids: denies     Weight: lost 20lbs in 3 months     ESS: 4  ROSELINE: 6    REVIEW OF SYSTEMS     All other systems have been reviewed and are negative.    ALLERGIES     Allergies[1]    MEDICATIONS     Current Medications[2]    PAST HISTORIES     PERTINENT PAST MEDICAL HISTORY: See HPI    PERTINENT PAST SURGICAL HISTORY for Sleep Medicine:  non-contributory    PERTINENT FAMILY HISTORY for Sleep Medicine:  sleep apnea on PAP- sister     PERTINENT SOCIAL HISTORY:  He  reports that he has quit smoking. His smoking use included cigarettes. He has never used smokeless tobacco. He reports that he does not currently use alcohol. He reports that he does not use drugs.     Active Problems, Allergy List, Medication List, and PMH/PSH/FH/Social Hx have been reviewed and reconciled in chart. No significant changes unless documented in the pertinent chart section. Updates made when necessary.     PHYSICAL EXAM     VITAL SIGNS: /82   Pulse 96   Resp 18   Ht 1.829 m (6')   Wt 87.1 kg (192 lb)   SpO2 92%   BMI 26.04 kg/m²     CURRENT WEIGHT:   Vitals:    07/10/25 1426   Weight: 87.1 kg (192 lb)      PREVIOUS WEIGHTS:  Wt Readings from Last 3 Encounters:   07/10/25 87.1 kg (192 lb)   06/16/25 90.8 kg (200 lb 1.1 oz)   05/08/25 90.7 kg (200 lb)     Physical Exam  Constitutional: Awake, not in distress  Skin: Warm, no rash  Neuro: No tremors, moves all extremities  Psych: alert and oriented to time, place, and person    HEENT:   Tonsils enlargement grade 1+   Airway comments: narrow lateral walls   Tongue scalloping: slight   Modified Mallampati score - 3    RESULTS/DATA     No results found for: \"IRON\", \"TRANSFERRIN\", \"IRONSAT\", \"TIBC\", \"FERRITIN\"    CARBON DIOXIDE   Date Value Ref Range Status   07/01/2025 28 20 - 32 mmol/L Final "       ASSESSMENT/PLAN     Mr. Alaniz is a 69 y.o. male and He was referred to the [facility] for evaluation of EDMUND    Problem List, Orders, Assessment, Recommendations:    # EDMUND  - PSG 6/16/2025-showing mild EDMUND on CMS criteria with RDI 4% 6.5, SpO2 shanta 86%  - Personally reviewed the sleep study's raw data such as interpretation report, data sheet, and hypnogram. Discussed sleep study results with patient today  - Will start APAP 5-15 cwp via DME- [facility]  - Sleep apnea, PAP therapy education as well as the tips to be successful with PAP treatment was provided at length in clinic today. Patient verbalized understanding.  - Discussed 30-day mask guarantee and insurance requirement regarding PAP compliance and follow-up.   - Diet, exercise, and weight loss were emphasized today in clinic, as were non-supine sleep, avoiding alcohol in the late evening, and driving or operating heavy machinery when sleepy.   - Patient will follow-up in 2-3 months and bring equipment to the follow-up clinic      #EDS/Fatigue + Sleep Disturbances  - likely due to untreated sleep apnea  - will reevaluate after successful treatment of EDMUND       #HTN  BP Readings from Last 1 Encounters:   07/10/25 123/82     - doing well, asymptomatic, denies any headache, blurry vision, chest pain, palpitation, dizziness, lightheadedness, or syncopal episodes  - discussed at length the impact of untreated EDMUND and BP control  - supportive management: low salt DASH diet (less than 2000 mg sodium intake daily), moderate intensity aerobic exercise at least 30 minutes 5 days per week, reduce stress, quit smoking, limit alcohol, lose weight, and monitor BP once daily  - continue current management and follow-up with PCP     # Overweight  BMI Readings from Last 1 Encounters:   07/10/25 26.04 kg/m²     - Encouraged healthy weight loss via diet and exercise  - Weight loss can help in the long term treatment of EDMUND.  - Defer management  to PCP     All of patient's questions were answered. He verbalizes understanding and agreement with my assessment and plan.    Disposition    Return to clinic in 3 months    I personally spent 45 minutes today (exclusive of procedures) providing care for this patient, including preparation, face to face time, EMR documentation and other services such as review of medical records, diagnostic results, patient education, counseling, and coordination of care.              [1]   Allergies  Allergen Reactions    Doxycycline Itching and Nausea Only    Penicillins Itching   [2]   Current Outpatient Medications   Medication Sig Dispense Refill    allopurinol (Zyloprim) 100 mg tablet Take 2 tablets (200 mg) by mouth once daily. 180 tablet 1    atorvastatin (Lipitor) 40 mg tablet Take 1 tablet (40 mg) by mouth once daily at bedtime. 90 tablet 1    cholecalciferol (Vitamin D-3) 25 MCG (1000 UT) capsule Take 1 capsule (25 mcg) by mouth 2 times a day.      doxazosin (Cardura) 2 mg tablet Take 1 tablet (2 mg) by mouth 2 times a day. 180 tablet 1    latanoprost (Xalatan) 0.005 % ophthalmic solution Administer 1 drop into the right eye once daily at bedtime.      losartan-hydrochlorothiazide (Hyzaar) 50-12.5 mg tablet Take 1 tablet by mouth once daily. 90 tablet 1    moxifloxacin (Vigamox) 0.5 % ophthalmic solution Administer 1 drop into the right eye 2 times a day. 10 mL 2    pantoprazole (ProtoNix) 40 mg EC tablet TAKE ONE TABLET BY MOUTH DAILY 90 tablet 1    prednisoLONE acetate (Pred-Forte) 1 % ophthalmic suspension Administer 1 drop into the right eye once daily.      sildenafil (Viagra) 100 mg tablet Take 1 tablet (100 mg) by mouth once daily as needed for erectile dysfunction. 12 tablet 1     No current facility-administered medications for this visit.

## 2025-07-02 ENCOUNTER — TELEPHONE (OUTPATIENT)
Dept: PRIMARY CARE | Facility: CLINIC | Age: 70
End: 2025-07-02
Payer: MEDICARE

## 2025-07-02 LAB
ALBUMIN SERPL-MCNC: 4 G/DL (ref 3.6–5.1)
ALP SERPL-CCNC: 55 U/L (ref 35–144)
ALT SERPL-CCNC: 22 U/L (ref 9–46)
ANION GAP SERPL CALCULATED.4IONS-SCNC: 7 MMOL/L (CALC) (ref 7–17)
AST SERPL-CCNC: 15 U/L (ref 10–35)
BASOPHILS # BLD AUTO: 29 CELLS/UL (ref 0–200)
BASOPHILS NFR BLD AUTO: 0.6 %
BILIRUB SERPL-MCNC: 0.4 MG/DL (ref 0.2–1.2)
BUN SERPL-MCNC: 21 MG/DL (ref 7–25)
CALCIUM SERPL-MCNC: 8.9 MG/DL (ref 8.6–10.3)
CHLORIDE SERPL-SCNC: 106 MMOL/L (ref 98–110)
CO2 SERPL-SCNC: 28 MMOL/L (ref 20–32)
CREAT SERPL-MCNC: 1.14 MG/DL (ref 0.7–1.35)
EGFRCR SERPLBLD CKD-EPI 2021: 70 ML/MIN/1.73M2
EOSINOPHIL # BLD AUTO: 197 CELLS/UL (ref 15–500)
EOSINOPHIL NFR BLD AUTO: 4.1 %
ERYTHROCYTE [DISTWIDTH] IN BLOOD BY AUTOMATED COUNT: 14.2 % (ref 11–15)
GLUCOSE SERPL-MCNC: 100 MG/DL (ref 65–139)
HCT VFR BLD AUTO: 42.4 % (ref 38.5–50)
HGB BLD-MCNC: 13.6 G/DL (ref 13.2–17.1)
LYMPHOCYTES # BLD AUTO: 1474 CELLS/UL (ref 850–3900)
LYMPHOCYTES NFR BLD AUTO: 30.7 %
MCH RBC QN AUTO: 29.1 PG (ref 27–33)
MCHC RBC AUTO-ENTMCNC: 32.1 G/DL (ref 32–36)
MCV RBC AUTO: 90.6 FL (ref 80–100)
MONOCYTES # BLD AUTO: 499 CELLS/UL (ref 200–950)
MONOCYTES NFR BLD AUTO: 10.4 %
NEUTROPHILS # BLD AUTO: 2602 CELLS/UL (ref 1500–7800)
NEUTROPHILS NFR BLD AUTO: 54.2 %
PLATELET # BLD AUTO: 189 THOUSAND/UL (ref 140–400)
PMV BLD REES-ECKER: 10.7 FL (ref 7.5–12.5)
POTASSIUM SERPL-SCNC: 4.7 MMOL/L (ref 3.5–5.3)
PROT SERPL-MCNC: 6.8 G/DL (ref 6.1–8.1)
RBC # BLD AUTO: 4.68 MILLION/UL (ref 4.2–5.8)
SODIUM SERPL-SCNC: 141 MMOL/L (ref 135–146)
WBC # BLD AUTO: 4.8 THOUSAND/UL (ref 3.8–10.8)

## 2025-07-02 NOTE — TELEPHONE ENCOUNTER
----- Message from Marquis Stein sent at 7/2/2025 11:39 AM EDT -----  Results are acceptable.  We will review the results in detail during office follow-up and please advise patient to keep the follow-up appointment as scheduled.   ----- Message -----  From: [facility] Results In  Sent: 7/1/2025  11:40 PM EDT  To: Marquis Stein MD

## 2025-07-09 PROBLEM — G47.33 OSA (OBSTRUCTIVE SLEEP APNEA): Status: ACTIVE | Noted: 2025-07-09

## 2025-07-10 ENCOUNTER — APPOINTMENT (OUTPATIENT)
Facility: CLINIC | Age: 70
End: 2025-07-10
Payer: MEDICARE

## 2025-07-10 VITALS
WEIGHT: 192 LBS | BODY MASS INDEX: 26.01 KG/M2 | HEART RATE: 96 BPM | HEIGHT: 72 IN | DIASTOLIC BLOOD PRESSURE: 82 MMHG | OXYGEN SATURATION: 92 % | RESPIRATION RATE: 18 BRPM | SYSTOLIC BLOOD PRESSURE: 123 MMHG

## 2025-07-10 DIAGNOSIS — E66.3 OVERWEIGHT (BMI 25.0-29.9): ICD-10-CM

## 2025-07-10 DIAGNOSIS — I10 ESSENTIAL HYPERTENSION, BENIGN: ICD-10-CM

## 2025-07-10 DIAGNOSIS — G47.19 EXCESSIVE DAYTIME SLEEPINESS: ICD-10-CM

## 2025-07-10 DIAGNOSIS — G47.33 OBSTRUCTIVE SLEEP APNEA SYNDROME: Primary | ICD-10-CM

## 2025-07-10 PROCEDURE — 3079F DIAST BP 80-89 MM HG: CPT | Performed by: NURSE PRACTITIONER

## 2025-07-10 PROCEDURE — 1036F TOBACCO NON-USER: CPT | Performed by: NURSE PRACTITIONER

## 2025-07-10 PROCEDURE — 1160F RVW MEDS BY RX/DR IN RCRD: CPT | Performed by: NURSE PRACTITIONER

## 2025-07-10 PROCEDURE — 1159F MED LIST DOCD IN RCRD: CPT | Performed by: NURSE PRACTITIONER

## 2025-07-10 PROCEDURE — G2211 COMPLEX E/M VISIT ADD ON: HCPCS | Performed by: NURSE PRACTITIONER

## 2025-07-10 PROCEDURE — G8433 SCR FOR DEP NOT CPT DOC RSN: HCPCS | Performed by: NURSE PRACTITIONER

## 2025-07-10 PROCEDURE — 99204 OFFICE O/P NEW MOD 45 MIN: CPT | Performed by: NURSE PRACTITIONER

## 2025-07-10 PROCEDURE — 3008F BODY MASS INDEX DOCD: CPT | Performed by: NURSE PRACTITIONER

## 2025-07-10 PROCEDURE — 3074F SYST BP LT 130 MM HG: CPT | Performed by: NURSE PRACTITIONER

## 2025-07-10 ASSESSMENT — ENCOUNTER SYMPTOMS
LOSS OF SENSATION IN FEET: 0
DEPRESSION: 0
OCCASIONAL FEELINGS OF UNSTEADINESS: 0

## 2025-07-10 ASSESSMENT — SLEEP AND FATIGUE QUESTIONNAIRES
HOW LIKELY ARE YOU TO NOD OFF OR FALL ASLEEP WHEN YOU ARE A PASSENGER IN A CAR FOR AN HOUR WITHOUT A BREAK: WOULD NEVER DOZE
HOW LIKELY ARE YOU TO NOD OFF OR FALL ASLEEP IN A CAR, WHILE STOPPED FOR A FEW MINUTES IN TRAFFIC: WOULD NEVER DOZE
SLEEP_PROBLEM_INTERFERES_DAILY_ACTIVITIES: A LITTLE
SLEEP_PROBLEM_NOTICEABLE_TO_OTHERS: A LITTLE
HOW LIKELY ARE YOU TO NOD OFF OR FALL ASLEEP WHILE WATCHING TV: MODERATE CHANCE OF DOZING
HOW LIKELY ARE YOU TO NOD OFF OR FALL ASLEEP WHILE SITTING AND READING: WOULD NEVER DOZE
HOW LIKELY ARE YOU TO NOD OFF OR FALL ASLEEP WHILE SITTING QUIETLY AFTER LUNCH WITHOUT ALCOHOL: WOULD NEVER DOZE
HOW LIKELY ARE YOU TO NOD OFF OR FALL ASLEEP WHILE SITTING AND TALKING TO SOMEONE: WOULD NEVER DOZE
SITING INACTIVE IN A PUBLIC PLACE LIKE A CLASS ROOM OR A MOVIE THEATER: WOULD NEVER DOZE
DIFFICULTY_FALLING_ASLEEP: MILD
WORRIED_DISTRESSED_DUE_TO_SLEEP: A LITTLE
HOW LIKELY ARE YOU TO NOD OFF OR FALL ASLEEP WHILE LYING DOWN TO REST IN THE AFTERNOON WHEN CIRCUMSTANCES PERMIT: MODERATE CHANCE OF DOZING
ESS-CHAD TOTAL SCORE: 4
SATISFACTION_WITH_CURRENT_SLEEP_PATTERN: SATISFIED

## 2025-07-10 ASSESSMENT — COLUMBIA-SUICIDE SEVERITY RATING SCALE - C-SSRS
2. HAVE YOU ACTUALLY HAD ANY THOUGHTS OF KILLING YOURSELF?: NO
1. IN THE PAST MONTH, HAVE YOU WISHED YOU WERE DEAD OR WISHED YOU COULD GO TO SLEEP AND NOT WAKE UP?: NO
6. HAVE YOU EVER DONE ANYTHING, STARTED TO DO ANYTHING, OR PREPARED TO DO ANYTHING TO END YOUR LIFE?: NO

## 2025-07-10 NOTE — PATIENT INSTRUCTIONS
[facility]  DO [address] [place] [address] [place] [address]       NAME: Osman Alaniz   DATE: 07/10/25    Your Sleep Provider Today: JOSH Reyes  Your Primary Care Physician: Marquis Stein MD       Thank you for coming to the Sleep Medicine Clinic today! Your sleep medicine provider today was: JOSH Reyes Below is a summary of your treatment plan, other important information, and our contact numbers:      TREATMENT PLAN     - Follow-up in 3 months.  - If not already done, sign up for 'My Chart' and send prescription requests or messages through this    Obstructive sleep apnea (EDMUND): EDMUND is a sleep disorder where your upper airway muscles relax during sleep and the airway intermittently and repetitively narrows and collapses leading to blocked airway (apnea) which, in turn, can disrupt breathing in sleep, lower oxygen levels while you sleep and cause night time wakings. Because apnea may cause higher carbon dioxide or low oxygen levels, untreated EDMUND can lead to heart arrhythmia, elevation of blood pressure, and make it harder for the body to consolidate memory and metabolize (leading to higher blood sugars at night).   Frequent partial arousals occur during sleep resulting in sleep deprivation and daytime sleepiness. EDMUND is associated with an increased risk of cardiovascular disease, stroke, hypertension, and insulin resistance. Moreover, untreated EDMUND with excessive daytime sleepiness can increase the risk of motor vehicular accidents.    Some conservative strategies for EDMUND are:   Positional therapy - Avoid sleeping on your back.   Healthy diet, exercise, and optimizing weight encouraged.   Avoid alcohol late in the evening as it can make sleep apnea worse.     Safety: Avoid driving and operating heavy equipment while sleepy. Drowsy driving may lead to life-threatening motor vehicle accidents.     Common  "treatment options for sleep apnea include weight management, positional therapy, Positive Airway Therapy (PAP) therapy, oral appliance therapy, hypoglossal nerve stimulation, and select airway surgeries.    Starting Positive Airway Pressure: You were ordered a device to wear when you sleep called PAP (Positive Airway Pressure) to treat your sleep apnea. The order will be submitted to a durable medical equipment company who will arrange setting you up with the device. They will provide all the necessary equipment and discuss use and maintenance of the device with you.     **Please bring all PAP equipment with you to follow up appointments unless told otherwise.**           Important things to keep in mind as you start PAP    Insurance will monitor your usage during the first 90 days.  You should use your PAP - \"all night, every night\", for your health. The bare minimum is to use your PAP device while sleeping = at least 4 hours per day at least 5 days per week. Otherwise, your PAP device may be reclaimed by your PAP vendor at 90 days.  There are many mask to choose from to wear with your PAP machine. If you are not comfortable with the first mask issued to you, call your DME and ask for another option to try (within the first 30 days).  Discuss with your provider if you are having issues breathing with the machine or the temperature or humidity feel uncomfortable.  Expect to have an adjustment period when you start your device. It helps to continuing wearing the machine every day for a period of time until you get more used to it. You can practice with wearing the mask alone if you need, then add in the PAP air pressure a few days later.   Reach out for help if you are struggling! The sleep medicine department can be reached at [phone]  We encourage you to download data monitoring apps to your phone. For [employer] 10/11 - MyAir elder. For [employer] - DreamMapper. Both are available in the Elder " store for free and are a great tool to monitor your progress with your CPAP night to night.    IF YOU ARE HAVING TROUBLE GETTING USED TO YOUR PAP DEVICE    The following steps are recommended to help you get accustomed to using CPAP and improve your CPAP usage at home:    Use CPAP every night for 5 to 10 minutes while awake in the evening without fail.  Be sure to remain awake while breathing on the nasal mask for the first 1 to 2 weeks.  After 2 to 4 weeks, start using CPAP at night when you go to sleep…But be sure to take the mask off if you have not fallen asleep in 5 to 10 minutes, or if you fall asleep.  Take the mask off whenever you become aware of it or the moment you wake up.   Continue this process every night, with confidence that you will gradually become accustomed to using CPAP over time.    Be sure you are using a comfortable mask, and that you are applying it snugly (but not too tight).    Common issues with PAP machine:  Mask gets dislodged when turning to the side: Consider getting a CPAP pillow or switching to a mask with hose on top.     Dry mouth:  Your machine has built-in humidifier that heats up the air to prevent dry mouth. It can be adjusted to your comfort. You can try that first and increase setting one level one night at a time to check which setting is comfortable and effective in lessening dry mouth. If dry mouth persists despite humidity setting adjustment, may apply OTC Biotene gel over the gums at bedtime.  If Biotene gel is not effective, consider trying XEROSTOM gel from [employer].  If using nasal pillows or nasal mask, consider adding chinstrap or mouth tape to keep your mouth closed while you sleep. Also, eliminate or reduce dose of meds that can cause dry mouth if possible. Lastly, may try getting a separate room humidifier machine.    Airleaks: Please call DME as they may need to adjust your mask or refit you with a different kind of mask. In addition, you can ask DME for  "tips on getting a good mask seal and mask fit.     Difficulty tolerating the mask: Contact your DME to try a different kind of mask and/or call office to get a referral to Sleep Psychologist for CPAP desensitization. CPAP desensitization technique is a set of strategies that helps patient cope with claustrophobia and anxiety related to wearing mask. Alternatively, we can do a daytime mini-sleep study called PAP-nap trial wherein you will try on different kinds of mask and the sleep technician will try different pressure settings on CPAP and BPAP machines to see which specific pressure is tolerable and comfortable for you.     Water droplets or moisture within the hose and/or mask: This is called rain-out and it is caused by condensation of too much heated humidity on the cooler walls of the hose. If you have rain-out, turn down/decrease your humidity setting or get a heated hose. If you already have a heated hose, turn up the \"tube temperature\" of the heated hose. Alternatively, if you don't want to get a heated hose or warmer air, may wrap the CPAP hose with stockings to keep it somewhat warm. Also, you need to place the machine on the floor and lower the hose so that water won't travel upward towards your mask.     Maintaining your CPAP/BPAP device:    The humidification chamber (aka water tank or water chamber) needs to be filled with distilled water to prevent buildup of white deposits in the future. If you cannot find distilled water, you can use tap water but expect to have white deposits buildup seen after prolonged use with tap water. If you start seeing white deposits on the water chamber, you can clean it by filling it with equal parts of distilled white vinegar and water. Let the vinegar-water mixture sit for 2 hours, and then rinse it with running tap water. Clean with soap and water then let it dry.     You should try to keep your machine clean in order to work well. Here are some tips to clean PAP " supplies / accessories:    Clean the humidification chamber (aka water tank) as well as your mask and tubing at least once a week with soap and water.   Alternatively, you can fill a sink or basin with warm water and add a little mild detergent, like Ivory dish soap. Gently wipe your supplies with the soapy water to free all the oils and dirt that may have collected. Once that's done, rinse these items with clean water until the soap is gone and let them air dry. You can hang your tubing over the curtain david in your bathroom so that it dries.  The mask insert (part of the mask that has contact with your skin) needs to be cleaned with soap and water daily. Another option is to wipe them down with CPAP wipes or baby wipes.    You should replace your mask and tubing frequently in order to prevent bacteria buildup, machine damage, and mask seal issues. The older the mask and hose, the high likelihood that there is bacteria buildup in it especially if they are not cleaned regularly. Dirty filters damage machines because build-up of dust and contaminants can cause machine to over-heat, and in time, damage the motor of machine. Cushions lose their seal over time as most masks are made of plastic and silicone while headgear is made of neoprene. These materials will break down with age and frequent use. Here is the recommended replacement schedule for PAP supplies / accessories:    Twice a month- disposable filters and cushions for nasal mask or nasal pillows.  Once a month- cushion for full face mask  Every 3 months- mask with headgear and PAP tubing (standard or heated hose)  Every 6 months- reusable filter, water chamber, and chin strap     Other useful information:    Some people do not put water in the tank while other people prefers to put water in the tank to prevent mouth dryness. Try to experiment to determine which is more comfortable for you.   In general, new machines have 2 years warranty on parts while health  insurance allows you to have a new machine once every 5 years.     You can also go to the following EDUCATION WEBSITES for further information:   American Academy of Sleep Medicine http://sleepeducation.org  National Sleep Foundation: https://sleepfoundation.org  American Sleep Apnea Association: https://www.sleepapnea.org (for patients with sleep apnea)    Here at [facility], we wish you a restful sleep!         IMPORTANT INFORMATION     Call 911 for medical emergencies.  Our offices are generally open from Monday-Friday, 9 am - 5 pm.  If you need to get in touch with me, you may either call me/my team (number is below) or you can use [employer].  If a referral for a test, for CPAP, or for another specialist was made, and you have not heard about scheduling this within a week, please call scheduling at [phone] ([phone]).  If you are unable to make your appointment for clinic or an overnight study, kindly call the office at least 48 hours in advance to cancel and reschedule.  There are no supporting services by either the sleep doctors or their staff on weekends and Holidays, or after 5 PM on weekdays.     PRESCRIPTIONS     We require 7 days advanced notice for prescription refills. If we do not receive the request in this time, we cannot guarantee that your medication will be refilled in time.    IMPORTANT PHONE NUMBERS     [facility]: [phone]  [employer] ([employer]): [phone]  [employer] (DME): [phone]  [facility] ([facility]): [phone]    CONTACTING YOUR SLEEP MEDICINE PROVIDER AND SLEEP TEAM      For issues with your machine or mask interface, please call your DME provider first. [employer] stands for durable medical company. [employer] is the company who provides you the machine and/or PAP supplies / accessories.   To schedule, cancel, or reschedule SLEEP STUDY APPOINTMENTS, please call the Main Phone Line at [phone] - option 3.   To schedule, cancel,  "or reschedule CLINIC APPOINTMENTS, you can do it in \"MyChart\", call [phone] for [facility] office to speak with my on site staff, or call the Main Phone Line at [phone] - option 2  For CLINICAL QUESTIONS or MEDICATION REFILLS, please call direct line for Adult Sleep Nurses at [phone].   Lastly, you can also send a message directly to your provider through \"My Chart\", which is the email service through your  Records Account: [email or web address]     Adult Sleep Nurses (Xuan Vicente, RN and Julianna Hong, RN):  For clinical questions and refilling prescriptions: [phone]  Email sleep diaries and other documents at: [email or web address]    Office locations for Evon Callejas NP:    [facility]   [address]   [address]  [place] [address] [phone]    [facility]  [address]  [address]  [place], [place] [address] [phone]    OUR SLEEP TESTING LOCATIONS     Our team will contact you to schedule your sleep study, however, you can contact us as follow:  Main Phone Line (scheduling only): [phone] ([phone]), option 3    Sleep Testing Locations:   [place] (18 years and older): [address] [place] [address], 2nd floor  [facility] (18 years and older): [address]; 4th floor  After hours line: [phone]  [facility] (18 years and older) at [place]: [id number] [place] [address]  After hours line: [phone]   [facility] (Main campus: All ages): [facility], 6th floor. After hours line: [phone]   [place] (5 years and older; younger considered on case-by-case basis): [address]; [facility] [address], [address]. Scheduling  After hours line: [phone]       Here at [facility], we wish you a restful sleep!   "

## 2025-07-25 ENCOUNTER — APPOINTMENT (OUTPATIENT)
Dept: DERMATOLOGY | Facility: CLINIC | Age: 70
End: 2025-07-25
Payer: MEDICARE

## 2025-08-08 DIAGNOSIS — E78.2 HYPERLIPIDEMIA, MIXED: ICD-10-CM

## 2025-08-08 DIAGNOSIS — R73.9 ELEVATED BLOOD SUGAR: ICD-10-CM

## 2025-08-08 DIAGNOSIS — E55.9 VITAMIN D DEFICIENCY: ICD-10-CM

## 2025-08-08 DIAGNOSIS — N52.9 ERECTILE DYSFUNCTION, UNSPECIFIED ERECTILE DYSFUNCTION TYPE: ICD-10-CM

## 2025-08-11 ENCOUNTER — APPOINTMENT (OUTPATIENT)
Dept: DERMATOLOGY | Facility: CLINIC | Age: 70
End: 2025-08-11
Payer: MEDICARE

## 2025-08-11 DIAGNOSIS — D18.01 HEMANGIOMA OF SKIN: ICD-10-CM

## 2025-08-11 DIAGNOSIS — Z12.83 ENCOUNTER FOR SCREENING FOR MALIGNANT NEOPLASM OF SKIN: ICD-10-CM

## 2025-08-11 DIAGNOSIS — D48.5 NEOPLASM OF UNCERTAIN BEHAVIOR OF SKIN: ICD-10-CM

## 2025-08-11 DIAGNOSIS — L82.1 SEBORRHEIC KERATOSIS: ICD-10-CM

## 2025-08-11 DIAGNOSIS — L57.0 ACTINIC KERATOSIS: ICD-10-CM

## 2025-08-11 DIAGNOSIS — L81.4 LENTIGO: ICD-10-CM

## 2025-08-11 DIAGNOSIS — L57.8 PHOTOAGING OF SKIN: Primary | ICD-10-CM

## 2025-08-11 PROCEDURE — 99213 OFFICE O/P EST LOW 20 MIN: CPT | Performed by: DERMATOLOGY

## 2025-08-11 PROCEDURE — 1159F MED LIST DOCD IN RCRD: CPT | Performed by: DERMATOLOGY

## 2025-08-11 PROCEDURE — 17000 DESTRUCT PREMALG LESION: CPT | Performed by: DERMATOLOGY

## 2025-08-11 PROCEDURE — 17003 DESTRUCT PREMALG LES 2-14: CPT | Performed by: DERMATOLOGY

## 2025-08-19 ENCOUNTER — TELEPHONE (OUTPATIENT)
Dept: SLEEP MEDICINE | Facility: HOSPITAL | Age: 70
End: 2025-08-19
Payer: MEDICARE

## 2025-08-29 ENCOUNTER — APPOINTMENT (OUTPATIENT)
Dept: OPHTHALMOLOGY | Facility: CLINIC | Age: 70
End: 2025-08-29
Payer: MEDICARE

## 2025-08-29 DIAGNOSIS — H02.88A MEIBOMIAN GLAND DYSFUNCTION (MGD) OF UPPER AND LOWER LIDS OF BOTH EYES: ICD-10-CM

## 2025-08-29 DIAGNOSIS — Z94.7 CORNEAL TRANSPLANT STATUS: Primary | ICD-10-CM

## 2025-08-29 DIAGNOSIS — H02.88B MEIBOMIAN GLAND DYSFUNCTION (MGD) OF UPPER AND LOWER LIDS OF BOTH EYES: ICD-10-CM

## 2025-08-29 DIAGNOSIS — S05.31XS RUPTURED GLOBE, RIGHT, SEQUELA: ICD-10-CM

## 2025-08-29 DIAGNOSIS — T86.8409 CORNEAL GRAFT REJECTION: ICD-10-CM

## 2025-08-29 PROCEDURE — 99212 OFFICE O/P EST SF 10 MIN: CPT | Performed by: OPTOMETRIST

## 2025-08-29 ASSESSMENT — CONF VISUAL FIELD
OD_SUPERIOR_NASAL_RESTRICTION: 0
OS_INFERIOR_TEMPORAL_RESTRICTION: 3
OS_INFERIOR_NASAL_RESTRICTION: 3
OD_NORMAL: 1
OS_SUPERIOR_NASAL_RESTRICTION: 3
OS_SUPERIOR_TEMPORAL_RESTRICTION: 3
OD_INFERIOR_NASAL_RESTRICTION: 0
OD_INFERIOR_TEMPORAL_RESTRICTION: 0
OD_SUPERIOR_TEMPORAL_RESTRICTION: 0

## 2025-08-29 ASSESSMENT — VISUAL ACUITY
OD_PH_CC: 20/400
CORRECTION_TYPE: CONTACTS
OD_PH_CC+: +1
OD_CC: 20/400-1
OD_CC: 20/400
OS_SC+: -1
OD_CC+: +1
OS_SC: 20/20
METHOD: SNELLEN - LINEAR
OS_CC: 20/20

## 2025-08-29 ASSESSMENT — SLIT LAMP EXAM - LIDS: COMMENTS: NORMAL

## 2025-08-29 ASSESSMENT — ENCOUNTER SYMPTOMS: EYES NEGATIVE: 1

## 2025-08-29 ASSESSMENT — TONOMETRY
OS_IOP_MMHG: 12
IOP_METHOD: GOLDMANN APPLANATION
OD_IOP_MMHG: DEFERRED

## 2025-08-29 ASSESSMENT — EXTERNAL EXAM - RIGHT EYE: OD_EXAM: NORMAL

## 2025-08-29 ASSESSMENT — EXTERNAL EXAM - LEFT EYE: OS_EXAM: NORMAL

## 2025-08-30 ASSESSMENT — REFRACTION_CURRENTRX
OD_SPHERE: -3.00
OD_BASECURVE: 9.8
OD_CYLINDER: SPHERE
OD_DIAMETER: 16.0

## 2025-08-30 ASSESSMENT — SLIT LAMP EXAM - LIDS: COMMENTS: MODERATE MGD

## 2025-09-15 ENCOUNTER — APPOINTMENT (OUTPATIENT)
Age: 70
End: 2025-09-15
Payer: MEDICARE

## 2025-10-09 ENCOUNTER — APPOINTMENT (OUTPATIENT)
Dept: PRIMARY CARE | Facility: CLINIC | Age: 70
End: 2025-10-09
Payer: MEDICARE

## 2025-10-10 ENCOUNTER — APPOINTMENT (OUTPATIENT)
Dept: OPHTHALMOLOGY | Facility: CLINIC | Age: 70
End: 2025-10-10
Payer: MEDICARE

## 2025-10-21 ENCOUNTER — APPOINTMENT (OUTPATIENT)
Dept: OPHTHALMOLOGY | Facility: CLINIC | Age: 70
End: 2025-10-21
Payer: MEDICARE

## 2025-12-12 ENCOUNTER — APPOINTMENT (OUTPATIENT)
Dept: OPHTHALMOLOGY | Facility: CLINIC | Age: 70
End: 2025-12-12
Payer: MEDICARE

## 2026-08-14 ENCOUNTER — APPOINTMENT (OUTPATIENT)
Dept: DERMATOLOGY | Facility: CLINIC | Age: 71
End: 2026-08-14
Payer: MEDICARE